# Patient Record
Sex: FEMALE | Race: WHITE | Employment: OTHER | URBAN - METROPOLITAN AREA
[De-identification: names, ages, dates, MRNs, and addresses within clinical notes are randomized per-mention and may not be internally consistent; named-entity substitution may affect disease eponyms.]

---

## 2017-02-17 DIAGNOSIS — G43.009 MIGRAINE WITHOUT AURA AND WITHOUT STATUS MIGRAINOSUS, NOT INTRACTABLE: ICD-10-CM

## 2017-02-17 RX ORDER — RIZATRIPTAN BENZOATE 10 MG/1
TABLET, ORALLY DISINTEGRATING ORAL
Qty: 6 TABLET | Refills: 3 | Status: SHIPPED | OUTPATIENT
Start: 2017-02-17 | End: 2018-06-13

## 2017-04-20 ENCOUNTER — TRANSFERRED RECORDS (OUTPATIENT)
Dept: HEALTH INFORMATION MANAGEMENT | Facility: CLINIC | Age: 57
End: 2017-04-20

## 2017-05-24 ENCOUNTER — TRANSFERRED RECORDS (OUTPATIENT)
Dept: HEALTH INFORMATION MANAGEMENT | Facility: CLINIC | Age: 57
End: 2017-05-24

## 2017-05-24 LAB
ALT SERPL-CCNC: 23 IU/L (ref 5–35)
AST SERPL-CCNC: 27 U/L (ref 5–34)
CREAT SERPL-MCNC: 0.59 MG/DL
GFR SERPL CREATININE-BSD FRML MDRD: 111.7 ML/MIN/1.73M2
TSH SERPL-ACNC: 2.52 MIU/L (ref 0.4–4.5)

## 2017-06-02 ENCOUNTER — OFFICE VISIT (OUTPATIENT)
Dept: FAMILY MEDICINE | Facility: CLINIC | Age: 57
End: 2017-06-02
Payer: COMMERCIAL

## 2017-06-02 ENCOUNTER — RADIANT APPOINTMENT (OUTPATIENT)
Dept: GENERAL RADIOLOGY | Facility: CLINIC | Age: 57
End: 2017-06-02
Attending: FAMILY MEDICINE
Payer: COMMERCIAL

## 2017-06-02 VITALS
WEIGHT: 156.7 LBS | TEMPERATURE: 97.8 F | SYSTOLIC BLOOD PRESSURE: 118 MMHG | DIASTOLIC BLOOD PRESSURE: 62 MMHG | HEIGHT: 61 IN | OXYGEN SATURATION: 95 % | HEART RATE: 94 BPM | BODY MASS INDEX: 29.59 KG/M2

## 2017-06-02 DIAGNOSIS — M25.562 LEFT KNEE PAIN, UNSPECIFIED CHRONICITY: ICD-10-CM

## 2017-06-02 DIAGNOSIS — M19.90 INFLAMMATORY ARTHRITIS: ICD-10-CM

## 2017-06-02 DIAGNOSIS — R21 RASH: ICD-10-CM

## 2017-06-02 DIAGNOSIS — K50.90 CROHN'S DISEASE WITHOUT COMPLICATION, UNSPECIFIED GASTROINTESTINAL TRACT LOCATION (H): ICD-10-CM

## 2017-06-02 DIAGNOSIS — M25.562 LEFT KNEE PAIN, UNSPECIFIED CHRONICITY: Primary | ICD-10-CM

## 2017-06-02 PROCEDURE — 73560 X-RAY EXAM OF KNEE 1 OR 2: CPT | Mod: LT

## 2017-06-02 PROCEDURE — 99213 OFFICE O/P EST LOW 20 MIN: CPT | Performed by: FAMILY MEDICINE

## 2017-06-02 ASSESSMENT — PAIN SCALES - GENERAL: PAINLEVEL: SEVERE PAIN (7)

## 2017-06-02 NOTE — PROGRESS NOTES
"  SUBJECTIVE:                                                    Ryan Cruz is a 56 year old female who presents to clinic today for the following health issues:      Joint Pain     Onset: January 2017    Description:   Location: left knee  Character: Gnawing and Fullness    Intensity: moderate    Progression of Symptoms: intermittent    Accompanying Signs & Symptoms:  Other symptoms: weakness of knee and swelling   History:   Previous similar pain: no       Precipitating factors:   Trauma or overuse: YES    Alleviating factors:  Improved by: rest/inactivity       Therapies Tried and outcome: none      Knee Pain -- Pt is unsure as to why her left knee has been hurting although she assumes she hurt it in January at yoga. She heard a \"ding\" in her knee causing her to fall over while doing yoga. She had immediate pain and swelling and was difficult to walk for a few days. Pain has been intermittent and persistent since then.    She had a cold a few months ago which required her to rest allowing the pain to temporarily alleviate. The pain has come back since she has been more active and the pain worsens the more she is on her feet. Pt says the pain  is behind the knee but occasionally feels below the knee-cap. 6 days ago it woke her in the middle of the night and her knee was swollen to the point where she could not bend it. Ice helps the swelling and pain. She cannot kneel on it  She also fell on her R knee last week trying to carry her dog but has no serious concerns.  Pt denies claustrophobia in regards to MRI    Pt also showed concern in the skin of her R hand. She says it starts as a blister, turns hard, and will fall off- which is painful. Last saw dermatology in the fall and reports derm did not know the cause of the rash, She has tried many creams which did not help.    Pt started discussing issues with calcium citrate. She took her first pill today and felt sick immediately- diarrhea. She was wondering if " she should continue this medication.      Problem list and histories reviewed & adjusted, as indicated.  Additional history: as documented    Patient Active Problem List   Diagnosis     Migraine without aura and without status migrainosus, not intractable     Allergic rhinitis     Inflammatory arthritis     Crohns disease (H)     CARDIOVASCULAR SCREENING; LDL GOAL LESS THAN 160     Health Care Home     Inflammatory bowel disease     Pulmonary nodule     Encounter for dietary counseling and surveillance     Left sided ulcerative (chronic) colitis (H)     Overweight (BMI 25.0-29.9)     Advance care planning     Past Surgical History:   Procedure Laterality Date     CARPAL TUNNEL RELEASE RT/LT       HYSTERECTOMY, SUPRACERVICAL LAPAROSCOPIC         Social History   Substance Use Topics     Smoking status: Never Smoker     Smokeless tobacco: Never Used     Alcohol use No     Family History   Problem Relation Age of Onset     Arthritis Mother      rheumatoid arthritis     CANCER Mother      stomach     GASTROINTESTINAL DISEASE Father       at 68 from Crohn's     DIABETES Father      Arthritis Sister      Rheumatoid arthritis     CEREBROVASCULAR DISEASE Maternal Grandfather      CEREBROVASCULAR DISEASE Paternal Grandfather          Current Outpatient Prescriptions   Medication Sig Dispense Refill     rizatriptan (MAXALT-MLT) 10 MG ODT tab Take 1 tablet with onset of migraine, may repeat once after 2 hrs. Do not exceed 3 tabs in 24 hrs. 6 tablet 3     Vedolizumab (ENTYVIO) 60 MG/ML injection        hyoscyamine (LEVSIN/SL) 0.125 MG SL tablet   0     polyethylene glycol (MIRALAX/GLYCOLAX) powder   4     mesalamine (CANASA) 1000 MG suppository Place 1 suppository rectally At Bedtime.       co-enzyme Q-10 (COQ10) 100 MG CAPS Take 1 capsule by mouth daily.       Cyanocobalamin (SM VITAMIN B12 TR) 2000 MCG TBCR Take 1 tablet by mouth daily.       UNABLE TO FIND MEDICATION NAME: Estrofactor - take 2 tablets by  mouth twice daily       PROBIOTIC PRODUCT PO Take 1 capsule by mouth daily.       UNABLE TO FIND MEDICATION NAME: Zinc liquid - 1 tsp daily       Calcium Carbonate-Vitamin D (CALCIUM 600 + D OR) Take 1 tablet by mouth daily.       Methotrexate Sodium (METHOTREXATE PO) Take 17.5 mg by mouth once a week. Takes on Sundays  2.5 mg x 7 tabs= 17.5 mg       Cholecalciferol (VITAMIN D3 PO) Take 5,000 Units by mouth daily.       hydroxychloroquine (PLAQUENIL) 200 MG tablet Take 200 mg by mouth 2 times daily.       FOLIC ACID PO Take 2 mg by mouth daily. Takes 2 x 1 mg tabs       LYSINE Take  by mouth every morning. Unknown strength       pseudoePHEDrine (SUDAFED) 120 MG 12 hr tablet Take 120 mg by mouth every 12 hours.       leucovorin (WELLCOVORIN) 5 MG tablet Take 5 mg by mouth daily.       GLUTAMINE PO Take 2,250 mg by mouth daily.       PEPPERMINT OIL Take 1 capsule by mouth daily.       cetirizine (ZYRTEC) 10 MG tablet Take 10 mg by mouth daily. One a day       balsalazide (COLAZAL) 750 MG capsule Take 2,250 mg by mouth 3 times daily.       Allergies   Allergen Reactions     Penicillins Anaphylaxis     Remicade [Infliximab Injection] Nausea and Vomiting, Hives, Itching and Swelling     Arthritis     Shellfish Allergy Anaphylaxis     6 Mercaptopurine [Mercaptopurine]      Headaches and mouth/throat sores     Bactrim Nausea and Vomiting     Omnicef Nausea and Vomiting     Oxycodone Nausea     Prednisone      Racing heart       Reviewed and updated as needed this visit by clinical staff  Tobacco  Allergies  Meds  Med Hx  Surg Hx  Fam Hx  Soc Hx      Reviewed and updated as needed this visit by Provider         ROS:  Constitutional, HEENT, cardiovascular, pulmonary, gi and gu systems are negative, except as otherwise noted.  This document serves as a record of the services and decisions personally performed and made by Jaelyn Garcia MD. It was created on her behalf by Nakita Corona, a trained medical  "scribe. The creation of this document is based the provider's statements to the medical scribe.  Nakita Corona June 2, 2017 2:47 PM      OBJECTIVE:                                                    /62 (BP Location: Right arm, Patient Position: Chair, Cuff Size: Adult Regular)  Pulse 94  Temp 97.8  F (36.6  C) (Oral)  Ht 1.56 m (5' 1.42\")  Wt 71.1 kg (156 lb 11.2 oz)  LMP 04/17/2014  SpO2 95%  Breastfeeding? No  BMI 29.21 kg/m2  Body mass index is 29.21 kg/(m^2).  GENERAL: healthy, alert and no distress, overweight  MS: left knee with small joint effesion.  patellar crepitus and apprehension present and tenderness, infrapatellar tenderness, Loli positive with lateral stressing. No significant joint line tenderness. Posterior knee tender and full (chronic per patient since this started)  SKIN: hands with red thickened and slightly lichenified skin in interdigital space 1st-2nd fingers and dorsum of mcps  NEURO: Normal strength and tone, mentation intact and speech normal  PSYCH: mentation appears normal, affect normal/bright    Diagnostic Test Results:  Xray -no fracture, ? Mild medial left knee joint space narrowing and nl alignment      ASSESSMENT/PLAN:                                                      1. Left knee pain, unspecified chronicity  Hx of inflammatory arhtritis.   Suspect baker's cyst (given chronicity not concerned about dvt). Need to r/o also meniscus tear. Xray unrevealing.  MRI to be scheduled. Continue to ice, wear compression brace, rest prn. Likely ortho referral needed for injection (pt quite sensitive to oral prednisone). Doubt her chronic inflammatory arthritis given onset with exercise, etc.   - MR Knee Left w/o Contrast; Future  - XR Knee Bilateral 1/2 Views; Future    2. Crohn's- ? Flare with calcium citrate-. Discussed unlikely med to cause this. rec taking pill other time of day or consider different brand  3. Rash- f/u with derm advised    Patient Instructions "   Continue exercise and daily activity as long as their is no pain      The information in this document, created by the medical scribe for me, accurately reflects the services I personally performed and the decisions made by me. I have reviewed and approved this document for accuracy.   MD Jaelyn Olvera MD  [facility]

## 2017-06-02 NOTE — NURSING NOTE
"Chief Complaint   Patient presents with     Knee Injury     left knee and right knee starting to hurt       Initial /62 (BP Location: Right arm, Patient Position: Chair, Cuff Size: Adult Regular)  Pulse 94  Temp 97.8  F (36.6  C) (Oral)  Ht 1.56 m (5' 1.42\")  Wt 71.1 kg (156 lb 11.2 oz)  LMP 04/17/2014  SpO2 95%  Breastfeeding? No  BMI 29.21 kg/m2 Estimated body mass index is 29.21 kg/(m^2) as calculated from the following:    Height as of this encounter: 1.56 m (5' 1.42\").    Weight as of this encounter: 71.1 kg (156 lb 11.2 oz).  Medication Reconciliation: complete     ED Luu MA      "

## 2017-06-02 NOTE — PATIENT INSTRUCTIONS
Continue exercise and daily activity as long as their is no pain.  Wear your knee brace.    Please call [facility] (formerly [facility]) at [phone] to schedule mri knee     Follow up with dermatology concerning your hand.

## 2017-06-02 NOTE — MR AVS SNAPSHOT
After Visit Summary   6/2/2017    Ryan Cruz    MRN: [id number]           Patient Information     Date Of Birth          1960        Visit Information        Provider Department      6/2/2017 2:20 PM Jaelyn Garcia MD [facility]        Today's Diagnoses     Left knee pain, unspecified chronicity    -  1    Crohn's disease without complication, unspecified gastrointestinal tract location (H)        Rash          Care Instructions    Continue exercise and daily activity as long as their is no pain.  Wear your knee brace.    Please call [facility] (formerly called [facility]) at [phone] to schedule mri knee     Follow up with dermatology concerning your hand.          Follow-ups after your visit        Future tests that were ordered for you today     Open Future Orders        Priority Expected Expires Ordered    MR Knee Left w/o Contrast Routine  6/2/2018 6/2/2017            Who to contact     If you have questions or need follow up information about today's clinic visit or your schedule please contact [facility] directly at [phone].  Normal or non-critical lab and imaging results will be communicated to you by echoBasehart, letter or phone within 4 business days after the clinic has received the results. If you do not hear from us within 7 days, please contact the clinic through echoBasehart or phone. If you have a critical or abnormal lab result, we will notify you by phone as soon as possible.  Submit refill requests through Conelum or call your pharmacy and they will forward the refill request to us. Please allow 3 business days for your refill to be completed.          Additional Information About Your Visit        MyChart Information     Conelum gives you secure access to your electronic health record. If you see a primary care provider, you can also send messages to your care team and make  "appointments. If you have questions, please call your primary care clinic.  If you do not have a primary care provider, please call [phone] and they will assist you.        Care EveryWhere ID     This is your Care EveryWhere ID. This could be used by other organizations to access your [facility] medical records  [id number]        Your Vitals Were     Pulse Temperature Height Last Period Pulse Oximetry Breastfeeding?    94 97.8  F (36.6  C) (Oral) 1.56 m (5' 1.42\") 04/17/2014 95% No    BMI (Body Mass Index)                   29.21 kg/m2            Blood Pressure from Last 3 Encounters:   06/02/17 118/62   09/09/16 102/68   09/14/15 116/70    Weight from Last 3 Encounters:   06/02/17 71.1 kg (156 lb 11.2 oz)   09/09/16 70 kg (154 lb 4.8 oz)   09/14/15 66.5 kg (146 lb 8 oz)               Primary Care Provider Office Phone # Fax #    Jaelyn Kelli Garcia [phone]       [facility] [address] [place] [address] [address]  [facility] [address]        Thank you!     Thank you for choosing [facility]  for your care. Our goal is always to provide you with excellent care. Hearing back from our patients is one way we can continue to improve our services. Please take a few minutes to complete the written survey that you may receive in the mail after your visit with us. Thank you!             Your Updated Medication List - Protect others around you: Learn how to safely use, store and throw away your medicines at www.disposemymeds.org.          This list is accurate as of: 6/2/17  3:34 PM.  Always use your most recent med list.                   Brand Name Dispense Instructions for use    balsalazide 750 MG capsule    COLAZAL     Take 2,250 mg by mouth 3 times daily.       CALCIUM 600 + D PO      Take 1 tablet by mouth daily.       CANASA 1000 MG Suppository   Generic drug:  mesalamine      Place 1 suppository rectally At Bedtime.       co-enzyme Q-10 100 MG Caps capsule      Take 1 capsule by " mouth daily.       ENTYVIO 60 MG/ML injection   Generic drug:  vedolizumab          FOLIC ACID PO      Take 2 mg by mouth daily. Takes 2 x 1 mg tabs       GLUTAMINE PO      Take 2,250 mg by mouth daily.       hydroxychloroquine 200 MG tablet    PLAQUENIL     Take 200 mg by mouth 2 times daily.       hyoscyamine 0.125 MG sublingual tablet    LEVSIN/SL         leucovorin 5 MG tablet    WELLCOVORIN     Take 5 mg by mouth daily.       LYSINE      Take  by mouth every morning. Unknown strength       METHOTREXATE PO      Take 17.5 mg by mouth once a week. Takes on Sundays 2.5 mg x 7 tabs= 17.5 mg       PEPPERMINT OIL      Take 1 capsule by mouth daily.       polyethylene glycol powder    MIRALAX/GLYCOLAX         PROBIOTIC PRODUCT PO      Take 1 capsule by mouth daily.       pseudoePHEDrine 120 MG 12 hr tablet    SUDAFED     Take 120 mg by mouth every 12 hours.       rizatriptan 10 MG ODT tab    MAXALT-MLT    6 tablet    Take 1 tablet with onset of migraine, may repeat once after 2 hrs. Do not exceed 3 tabs in 24 hrs.       SM VITAMIN B12 TR 2000 MCG Tbcr   Generic drug:  Cyanocobalamin      Take 1 tablet by mouth daily.       * UNABLE TO FIND      MEDICATION NAME: Estrofactor - take 2 tablets by mouth twice daily       * UNABLE TO FIND      MEDICATION NAME: Zinc liquid - 1 tsp daily       VITAMIN D3 PO      Take 5,000 Units by mouth daily.       ZYRTEC 10 MG tablet   Generic drug:  cetirizine      Take 10 mg by mouth daily. One a day       * Notice:  This list has 2 medication(s) that are the same as other medications prescribed for you. Read the directions carefully, and ask your doctor or other care provider to review them with you.

## 2017-06-08 ENCOUNTER — RADIANT APPOINTMENT (OUTPATIENT)
Dept: MRI IMAGING | Facility: CLINIC | Age: 57
End: 2017-06-08
Attending: FAMILY MEDICINE
Payer: COMMERCIAL

## 2017-06-08 DIAGNOSIS — S83.209A CURRENT TEAR OF MENISCUS OF KNEE, UNSPECIFIED LATERALITY, UNSPECIFIED MENISCUS, UNSPECIFIED TEAR TYPE, INITIAL ENCOUNTER: Primary | ICD-10-CM

## 2017-06-08 DIAGNOSIS — M25.562 LEFT KNEE PAIN, UNSPECIFIED CHRONICITY: ICD-10-CM

## 2017-06-08 PROCEDURE — 73721 MRI JNT OF LWR EXTRE W/O DYE: CPT | Mod: LT | Performed by: RADIOLOGY

## 2017-06-19 ENCOUNTER — PRE VISIT (OUTPATIENT)
Dept: ORTHOPEDICS | Facility: CLINIC | Age: 57
End: 2017-06-19

## 2017-06-19 NOTE — TELEPHONE ENCOUNTER
Pt reports being seen for : knee pain with meniscus tear since January 1. Do you have recent xrays (if not seen in EPIC)? Yes - Xray are in EPIC.  2. Do you have any MRI's (if not seen in EPIC)?Yes - Confirmed in EPIC.  3. Have you had surgery in the past for the same issue?No.   4. Are you being referred by another provider? Yes: Dr. Garcia  If yes-Records in Epic.  5. Is this work comp or MVA related? No.   Diana Castanon RN

## 2017-06-20 ENCOUNTER — TRANSFERRED RECORDS (OUTPATIENT)
Dept: HEALTH INFORMATION MANAGEMENT | Facility: CLINIC | Age: 57
End: 2017-06-20

## 2017-06-21 ENCOUNTER — TRANSFERRED RECORDS (OUTPATIENT)
Dept: HEALTH INFORMATION MANAGEMENT | Facility: CLINIC | Age: 57
End: 2017-06-21

## 2017-06-22 ENCOUNTER — OFFICE VISIT (OUTPATIENT)
Dept: ORTHOPEDICS | Facility: CLINIC | Age: 57
End: 2017-06-22
Payer: COMMERCIAL

## 2017-06-22 VITALS
HEART RATE: 81 BPM | SYSTOLIC BLOOD PRESSURE: 111 MMHG | WEIGHT: 156.7 LBS | HEIGHT: 61 IN | OXYGEN SATURATION: 98 % | BODY MASS INDEX: 29.59 KG/M2 | DIASTOLIC BLOOD PRESSURE: 67 MMHG

## 2017-06-22 DIAGNOSIS — M17.10 PRIMARY OSTEOARTHRITIS OF KNEE, UNSPECIFIED LATERALITY: Primary | ICD-10-CM

## 2017-06-22 PROCEDURE — 99203 OFFICE O/P NEW LOW 30 MIN: CPT | Mod: GC | Performed by: ORTHOPAEDIC SURGERY

## 2017-06-22 NOTE — PROGRESS NOTES
Patient seen and examined with the resident.     Assesment: Medial Meniscus root tear, early chondrosis, minimally symptomatic, not interested in intervention    Plan: reviewed options, patient understands that she is at risk of progressive OA of medial compartment, which may / or may not be alleviated by surgery.    Patient is not interested in surgical intervention at this time. Will continue to observe, could have injection if she wants. Her knee may continue to wear out, though if this does, it could be treated with arthroplasty in the future    I agree with history, physical and imaging as well as the assessment and plan as detailed by Dr. Ellison.

## 2017-06-22 NOTE — PATIENT INSTRUCTIONS
Thanks for coming today.  Ortho/Sports Medicine Clinic  [address] [place] [address]    To schedule future appointments in Ortho Clinic, you may call [phone].    To schedule ordered imaging by your Provider: Call [facility] at [phone]    [facility] available online at:   [email or web address]    Please call if any further questions or concerns [phone] and ask for the Orthopedic Department. Clinic hours 8 am to 5 pm.    Return to clinic if symptoms worsen.

## 2017-06-22 NOTE — MR AVS SNAPSHOT
After Visit Summary   6/22/2017    Ryan Cruz    MRN: [id number]           Patient Information     Date Of Birth          1960        Visit Information        Provider Department      6/22/2017 10:30 AM Dean Strickland MD [facility]        Today's Diagnoses     Primary osteoarthritis of knee, unspecified laterality    -  1      Care Instructions    Thanks for coming today.  Ortho/Sports Medicine Clinic  [address] [place] [address]    To schedule future appointments in Ortho Clinic, you may call [phone].    To schedule ordered imaging by your Provider: Call [facility] at [phone]    [facility] available online at:   [email or web address]    Please call if any further questions or concerns [phone] and ask for the Orthopedic Department. Clinic hours 8 am to 5 pm.    Return to clinic if symptoms worsen.            Follow-ups after your visit        Who to contact     If you have questions or need follow up information about today's clinic visit or your schedule please contact [facility] directly at [phone].  Normal or non-critical lab and imaging results will be communicated to you by EMBIhart, letter or phone within 4 business days after the clinic has received the results. If you do not hear from us within 7 days, please contact the clinic through Comenta TVt or phone. If you have a critical or abnormal lab result, we will notify you by phone as soon as possible.  Submit refill requests through Adpeps or call your pharmacy and they will forward the refill request to us. Please allow 3 business days for your refill to be completed.          Additional Information About Your Visit        EMBIhart Information     Adpeps gives you secure access to your electronic health record. If you see a primary care provider, you can also send messages to your care team and make appointments. If you have questions, please  "call your primary care clinic.  If you do not have a primary care provider, please call [phone] and they will assist you.      ValueClick is an electronic gateway that provides easy, online access to your medical records. With ValueClick, you can request a clinic appointment, read your test results, renew a prescription or communicate with your care team.     To access your existing account, please contact your [facility] or call [phone] for assistance.        Care EveryWhere ID     This is your Care EveryWhere ID. This could be used by other organizations to access your [facility] medical records  [id number]        Your Vitals Were     Pulse Height Last Period Pulse Oximetry BMI (Body Mass Index)       81 1.56 m (5' 1.42\") 04/17/2014 98% 29.2 kg/m2        Blood Pressure from Last 3 Encounters:   06/22/17 111/67   06/02/17 118/62   09/09/16 102/68    Weight from Last 3 Encounters:   06/22/17 71.1 kg (156 lb 11.2 oz)   06/02/17 71.1 kg (156 lb 11.2 oz)   09/09/16 70 kg (154 lb 4.8 oz)              Today, you had the following     No orders found for display       Primary Care Provider Office Phone # Fax #    Jaelyn Garcia [phone]       [facility] [address] [facility] [address]  [facility] [address]        Equal Access to Services     [facility]: Hadii aad ku hadasho Soomaali, waaxda luqadaha, qaybta kaalmada adeegyada, hoa barclay haynicon carly buckner . So [facility] [phone].    ATENCIÓN: Si habla español, tiene a sommers disposición servicios gratuitos de asistencia lingüística. Llame al [phone].    We comply with applicable federal civil rights laws and [place] laws. We do not discriminate on the basis of race, color, national origin, age, disability sex, sexual orientation or gender identity.            Thank you!     Thank you for choosing [facility]  for your care. Our goal is always to provide you with excellent " care. Hearing back from our patients is one way we can continue to improve our services. Please take a few minutes to complete the written survey that you may receive in the mail after your visit with us. Thank you!             Your Updated Medication List - Protect others around you: Learn how to safely use, store and throw away your medicines at www.disposemymeds.org.          This list is accurate as of: 6/22/17 11:59 PM.  Always use your most recent med list.                   Brand Name Dispense Instructions for use Diagnosis    balsalazide 750 MG capsule    COLAZAL     Take 2,250 mg by mouth 3 times daily.        CALCIUM 600 + D PO      Take 1 tablet by mouth daily.        CANASA 1000 MG Suppository   Generic drug:  mesalamine      Place 1 suppository rectally At Bedtime.        co-enzyme Q-10 100 MG Caps capsule      Take 1 capsule by mouth daily.        ENTYVIO 60 MG/ML injection   Generic drug:  vedolizumab           FOLIC ACID PO      Take 2 mg by mouth daily. Takes 2 x 1 mg tabs        GLUTAMINE PO      Take 2,250 mg by mouth daily.        hydroxychloroquine 200 MG tablet    PLAQUENIL     Take 200 mg by mouth 2 times daily.        hyoscyamine 0.125 MG sublingual tablet    LEVSIN/SL          leucovorin 5 MG tablet    WELLCOVORIN     Take 5 mg by mouth daily.        LYSINE      Take  by mouth every morning. Unknown strength        METHOTREXATE PO      Take 17.5 mg by mouth once a week. Takes on Sundays 2.5 mg x 7 tabs= 17.5 mg        PEPPERMINT OIL      Take 1 capsule by mouth daily.        polyethylene glycol powder    MIRALAX/GLYCOLAX          PROBIOTIC PRODUCT PO      Take 1 capsule by mouth daily.        pseudoePHEDrine 120 MG 12 hr tablet    SUDAFED     Take 120 mg by mouth every 12 hours.        rizatriptan 10 MG ODT tab    MAXALT-MLT    6 tablet    Take 1 tablet with onset of migraine, may repeat once after 2 hrs. Do not exceed 3 tabs in 24 hrs.    Migraine without aura and without status  migrainosus, not intractable       SM VITAMIN B12 TR 2000 MCG Tbcr   Generic drug:  Cyanocobalamin      Take 1 tablet by mouth daily.        * UNABLE TO FIND      MEDICATION NAME: Estrofactor - take 2 tablets by mouth twice daily        * UNABLE TO FIND      MEDICATION NAME: Zinc liquid - 1 tsp daily        VITAMIN D3 PO      Take 5,000 Units by mouth daily.        ZYRTEC 10 MG tablet   Generic drug:  cetirizine      Take 10 mg by mouth daily. One a day        * Notice:  This list has 2 medication(s) that are the same as other medications prescribed for you. Read the directions carefully, and ask your doctor or other care provider to review them with you.

## 2017-06-22 NOTE — NURSING NOTE
"Ryan Cruz's goals for this visit include: Left knee tear consult-only pain is from cyst in back of knee  She requests these members of her care team be copied on today's visit information: yes    PCP: Jaelyn Garcia    Referring Provider:  No referring provider defined for this encounter.    Chief Complaint   Patient presents with     Consult     Knee Pain     Left knee meniscus tear       Initial /67  Pulse 81  Ht 1.56 m (5' 1.42\")  Wt 71.1 kg (156 lb 11.2 oz)  LMP 04/17/2014  SpO2 98%  BMI 29.2 kg/m2 Estimated body mass index is 29.2 kg/(m^2) as calculated from the following:    Height as of this encounter: 1.56 m (5' 1.42\").    Weight as of this encounter: 71.1 kg (156 lb 11.2 oz).  Medication Reconciliation: complete    "

## 2017-06-22 NOTE — PROGRESS NOTES
CHIEF COMPLAINT:  Left knee pain.      HISTORY OF PRESENT ILLNESS:  Ms. Cruz is a 56-year-old female with several months of left knee pain that began in approximately 01/2017.  There was no specific inciting event.  She describes the pain as predominantly being in the back of her knee.  Wearing a sleeve, resting and using ice does improve her pain and she believes her pain has improved quite significantly over the last 1-2 months.  She was referred here by Dr. Carline Garcia of [place], [place].  Of note, the patient does have Crohn's disease and is on methotrexate.  The patient does not have any other musculoskeletal complaints.  The patient does not predominantly have pain on the inside versus outside of her knee.      REVIEW OF SYSTEMS:  A 12-point review of systems is otherwise negative.      PAST SURGICAL HISTORY:  Significant for Crohn's.      MEDICATIONS:  Include methotrexate.  Others are per Epic.  No changes.      ALLERGIES:  INCLUDE PENICILLIN.  SHE HAS AN ANAPHYLACTIC REACTION TO THIS.  SHE HAS NAUSEA WITH REMICADE.  SHE ALSO HAS A SHELLFISH ALLERGY.  SHE IS ALSO ALLERGIC TO BACTRIM, OXYCODONE AND PREDNISONE.      FAMILY HISTORY:  No bleeding, clotting disorders or reactions to anesthesia      SOCIAL HISTORY:  The patient is retired.  She does not have any bad habits.  She enjoys traveling.      PAST SURGICAL HISTORY:  Includes a hysterectomy.      PHYSICAL EXAMINATION:  The patient is in no acute distress, breathing comfortably.  Specific examination of the left knee demonstrates no significant effusion.  No tenderness to palpation over the medial lateral joint line.  Negative Sharee's.  The patient has full extension to 130 degrees of flexion, stable to varus and valgus stress, anterior and posterior drawer.      IMAGING:  X-rays of the bilateral knees are reviewed.  This demonstrates mild medial joint space narrowing of the left knee.  MRI of the left knee demonstrates a root tear of the  medial meniscus.  There is no significant osteoarthritis currently present in the knee.      ASSESSMENT:   1.  Medial meniscus root tear.   2.  Minimal osteoarthritis.      PLAN:  We discussed with Ms. Cruz possible options moving forward including continued observation versus injection versus repair arthroscopically.  The patient elects to consider continued conservative measures as she does not have significant pain currently.  We did discuss with her that she is at a high risk of progressing to further arthritis with or without an operation secondary to her root tear.  The patient is aware of her risk of progression to OA and potential need for a knee replacement in the future.  We are happy to see her back at any time if her symptoms become more bothersome.        Dr. Strickland saw and evaluated the patient and is in agreement with the above stated plan.      Dictated by Morena Ellison MD, Fellow

## 2017-07-06 ENCOUNTER — HOME INFUSION (PRE-WILLOW HOME INFUSION) (OUTPATIENT)
Dept: PHARMACY | Facility: CLINIC | Age: 57
End: 2017-07-06

## 2017-08-28 NOTE — PROGRESS NOTES
This is a recent snapshot of the patient's [facility] medical record.  For current drug dose and complete information and questions, call [phone] or In Basket pool, [facility] home infusion ([id number])  CSN Number:  [id number]

## 2017-09-22 ENCOUNTER — ALLIED HEALTH/NURSE VISIT (OUTPATIENT)
Dept: NURSING | Facility: CLINIC | Age: 57
End: 2017-09-22
Payer: COMMERCIAL

## 2017-09-22 ENCOUNTER — TRANSFERRED RECORDS (OUTPATIENT)
Dept: HEALTH INFORMATION MANAGEMENT | Facility: CLINIC | Age: 57
End: 2017-09-22

## 2017-09-22 DIAGNOSIS — Z23 NEED FOR PROPHYLACTIC VACCINATION AND INOCULATION AGAINST INFLUENZA: Primary | ICD-10-CM

## 2017-09-22 PROCEDURE — 90686 IIV4 VACC NO PRSV 0.5 ML IM: CPT

## 2017-09-22 PROCEDURE — 90471 IMMUNIZATION ADMIN: CPT

## 2017-09-22 PROCEDURE — 99207 ZZC NO CHARGE NURSE ONLY: CPT

## 2017-09-22 NOTE — PROGRESS NOTES
Injectable Influenza Immunization Documentation    1.  Is the person to be vaccinated sick today?   No    2. Does the person to be vaccinated have an allergy to a component   of the vaccine?   No    3. Has the person to be vaccinated ever had a serious reaction   to influenza vaccine in the past?  Local reaction many years ago    4. Has the person to be vaccinated ever had Guillain-Barré syndrome?   No    Form completed by Courtney Snachez MA

## 2017-09-22 NOTE — MR AVS SNAPSHOT
After Visit Summary   9/22/2017    yRan Cruz    MRN: [id number]           Patient Information     Date Of Birth          1960        Visit Information        Provider Department      9/22/2017 11:00 AM [facility]LL[facility]        Today's Diagnoses     Need for prophylactic vaccination and inoculation against influenza    -  1       Follow-ups after your visit        Who to contact     If you have questions or need follow up information about today's clinic visit or your schedule please contact [facility] directly at [phone].  Normal or non-critical lab and imaging results will be communicated to you by Karazhart, letter or phone within 4 business days after the clinic has received the results. If you do not hear from us within 7 days, please contact the clinic through Karazhart or phone. If you have a critical or abnormal lab result, we will notify you by phone as soon as possible.  Submit refill requests through Coupang or call your pharmacy and they will forward the refill request to us. Please allow 3 business days for your refill to be completed.          Additional Information About Your Visit        MyChart Information     Coupang gives you secure access to your electronic health record. If you see a primary care provider, you can also send messages to your care team and make appointments. If you have questions, please call your primary care clinic.  If you do not have a primary care provider, please call [phone] and they will assist you.        Care EveryWhere ID     This is your Care EveryWhere ID. This could be used by other organizations to access your [facility] medical records  [id number]        Your Vitals Were     Last Period                   04/17/2014            Blood Pressure from Last 3 Encounters:   06/22/17 111/67   06/02/17 118/62   09/09/16 102/68    Weight from Last 3 Encounters:   06/22/17 71.1 kg (156 lb 11.2 oz)   06/02/17  71.1 kg (156 lb 11.2 oz)   09/09/16 70 kg (154 lb 4.8 oz)              We Performed the Following     FLU VAC, SPLIT VIRUS IM > 3 YO (QUADRIVALENT) [70717]     Vaccine Administration, Initial [30253]        Primary Care Provider Office Phone # Fax #    Jaelyn Garcia [phone] [address]  [place] [address]        Equal Access to Services     [facility] : Hadii aad ku hadasho Soomaali, waaxda luqadaha, qaybta kaalmada adeegyada, waxay idiin hayaan adeeg kharash la'aan . So [facility] [phone].    ATENCIÓN: Si habla español, tiene a sommers disposición servicios gratuitos de asistencia lingüística. [facility] [phone].    We comply with applicable federal civil rights laws and [place] laws. We do not discriminate on the basis of race, color, national origin, age, disability sex, sexual orientation or gender identity.            Thank you!     Thank you for choosing [facility]  for your care. Our goal is always to provide you with excellent care. Hearing back from our patients is one way we can continue to improve our services. Please take a few minutes to complete the written survey that you may receive in the mail after your visit with us. Thank you!             Your Updated Medication List - Protect others around you: Learn how to safely use, store and throw away your medicines at www.disposemymeds.org.          This list is accurate as of: 9/22/17 11:00 AM.  Always use your most recent med list.                   Brand Name Dispense Instructions for use Diagnosis    balsalazide 750 MG capsule    COLAZAL     Take 2,250 mg by mouth 3 times daily.        CALCIUM 600 + D PO      Take 1 tablet by mouth daily.        CANASA 1000 MG Suppository   Generic drug:  mesalamine      Place 1 suppository rectally At Bedtime.        co-enzyme Q-10 100 MG Caps capsule      Take 1 capsule by mouth daily.        ENTYVIO 60 MG/ML injection   Generic drug:  vedolizumab            FOLIC ACID PO      Take 2 mg by mouth daily. Takes 2 x 1 mg tabs        GLUTAMINE PO      Take 2,250 mg by mouth daily.        hydroxychloroquine 200 MG tablet    PLAQUENIL     Take 200 mg by mouth 2 times daily.        hyoscyamine 0.125 MG sublingual tablet    LEVSIN/SL          leucovorin 5 MG tablet    WELLCOVORIN     Take 5 mg by mouth daily.        LYSINE      Take  by mouth every morning. Unknown strength        METHOTREXATE PO      Take 17.5 mg by mouth once a week. Takes on Sundays 2.5 mg x 7 tabs= 17.5 mg        PEPPERMINT OIL      Take 1 capsule by mouth daily.        polyethylene glycol powder    MIRALAX/GLYCOLAX          PROBIOTIC PRODUCT PO      Take 1 capsule by mouth daily.        pseudoePHEDrine 120 MG 12 hr tablet    SUDAFED     Take 120 mg by mouth every 12 hours.        rizatriptan 10 MG ODT tab    MAXALT-MLT    6 tablet    Take 1 tablet with onset of migraine, may repeat once after 2 hrs. Do not exceed 3 tabs in 24 hrs.    Migraine without aura and without status migrainosus, not intractable       SM VITAMIN B12 TR 2000 MCG Tbcr   Generic drug:  Cyanocobalamin      Take 1 tablet by mouth daily.        * UNABLE TO FIND      MEDICATION NAME: Estrofactor - take 2 tablets by mouth twice daily        * UNABLE TO FIND      MEDICATION NAME: Zinc liquid - 1 tsp daily        VITAMIN D3 PO      Take 5,000 Units by mouth daily.        ZYRTEC 10 MG tablet   Generic drug:  cetirizine      Take 10 mg by mouth daily. One a day        * Notice:  This list has 2 medication(s) that are the same as other medications prescribed for you. Read the directions carefully, and ask your doctor or other care provider to review them with you.

## 2017-09-27 ENCOUNTER — TRANSFERRED RECORDS (OUTPATIENT)
Dept: HEALTH INFORMATION MANAGEMENT | Facility: CLINIC | Age: 57
End: 2017-09-27

## 2017-09-27 LAB
ALT SERPL-CCNC: 30 IU/L (ref 5–35)
AST SERPL-CCNC: 26 U/L (ref 5–34)
CREAT SERPL-MCNC: 0.5 MG/DL (ref 0.5–1.3)
GFR SERPL CREATININE-BSD FRML MDRD: 134.7 ML/MIN/1.73M2

## 2017-10-04 ENCOUNTER — RADIANT APPOINTMENT (OUTPATIENT)
Dept: MAMMOGRAPHY | Facility: CLINIC | Age: 57
End: 2017-10-04
Attending: FAMILY MEDICINE
Payer: COMMERCIAL

## 2017-10-04 DIAGNOSIS — Z12.31 VISIT FOR SCREENING MAMMOGRAM: ICD-10-CM

## 2017-10-04 PROCEDURE — G0202 SCR MAMMO BI INCL CAD: HCPCS | Performed by: STUDENT IN AN ORGANIZED HEALTH CARE EDUCATION/TRAINING PROGRAM

## 2017-10-04 PROCEDURE — 77063 BREAST TOMOSYNTHESIS BI: CPT | Performed by: STUDENT IN AN ORGANIZED HEALTH CARE EDUCATION/TRAINING PROGRAM

## 2017-10-14 ENCOUNTER — MYC MEDICAL ADVICE (OUTPATIENT)
Dept: FAMILY MEDICINE | Facility: CLINIC | Age: 57
End: 2017-10-14

## 2017-10-16 ENCOUNTER — MYC MEDICAL ADVICE (OUTPATIENT)
Dept: FAMILY MEDICINE | Facility: CLINIC | Age: 57
End: 2017-10-16

## 2017-10-24 ENCOUNTER — TRANSFERRED RECORDS (OUTPATIENT)
Dept: HEALTH INFORMATION MANAGEMENT | Facility: CLINIC | Age: 57
End: 2017-10-24

## 2017-11-03 ENCOUNTER — TRANSFERRED RECORDS (OUTPATIENT)
Dept: HEALTH INFORMATION MANAGEMENT | Facility: CLINIC | Age: 57
End: 2017-11-03

## 2017-11-13 ENCOUNTER — MYC MEDICAL ADVICE (OUTPATIENT)
Dept: FAMILY MEDICINE | Facility: CLINIC | Age: 57
End: 2017-11-13

## 2017-11-15 ENCOUNTER — TRANSFERRED RECORDS (OUTPATIENT)
Dept: HEALTH INFORMATION MANAGEMENT | Facility: CLINIC | Age: 57
End: 2017-11-15

## 2017-12-12 ENCOUNTER — HOME INFUSION (PRE-WILLOW HOME INFUSION) (OUTPATIENT)
Dept: PHARMACY | Facility: CLINIC | Age: 57
End: 2017-12-12

## 2017-12-13 NOTE — PROGRESS NOTES
This is a recent snapshot of the patient's [facility] medical record.  For current drug dose and complete information and questions, call [phone] or In Basket pool, [facility] home infusion ([id number])  CSN Number:  [id number]

## 2017-12-19 ENCOUNTER — HOME INFUSION (PRE-WILLOW HOME INFUSION) (OUTPATIENT)
Dept: PHARMACY | Facility: CLINIC | Age: 57
End: 2017-12-19

## 2018-01-02 NOTE — PROGRESS NOTES
This is a recent snapshot of the patient's [facility] medical record.  For current drug dose and complete information and questions, call [phone] or In Basket pool, fv home infusion ([id number])  CSN Number:  [id number]

## 2018-03-15 ENCOUNTER — TELEPHONE (OUTPATIENT)
Dept: FAMILY MEDICINE | Facility: CLINIC | Age: 58
End: 2018-03-15

## 2018-03-15 DIAGNOSIS — Z00.00 ENCOUNTER FOR ROUTINE ADULT HEALTH EXAMINATION WITHOUT ABNORMAL FINDINGS: Primary | ICD-10-CM

## 2018-03-15 DIAGNOSIS — K51.50 LEFT SIDED ULCERATIVE (CHRONIC) COLITIS (H): ICD-10-CM

## 2018-03-15 NOTE — TELEPHONE ENCOUNTER
Reason for Call:  Other appointment    Detailed comments: Pt would like to know what a one week fasting lab is.  Pt was explained what a fasting lab apt  is but would still  a call back for more clarification.    Phone Number Patient can be reached at: Cell number on file:    Telephone Information:   Mobile [phone]       Best Time: anytime    Can we leave a detailed message on this number? YES    Call taken on 3/15/2018 at 2:49 PM by Elijah Biggs

## 2018-03-16 NOTE — TELEPHONE ENCOUNTER
Spoke with pt, she was sent my chart message from [facility] stating pre lab before physical appt     Pt says she will schedule lab visit a few days prior to physical, please place orders.      Sue Mayes MA

## 2018-03-16 NOTE — TELEPHONE ENCOUNTER
Team please call patient and gather more information on what patient is wanting to know specifically? Patient does not have any future labs ordered. See patient call below dated 3/15/18.  Jenn Morrow RN

## 2018-05-15 ENCOUNTER — TRANSFERRED RECORDS (OUTPATIENT)
Dept: HEALTH INFORMATION MANAGEMENT | Facility: CLINIC | Age: 58
End: 2018-05-15

## 2018-05-18 DIAGNOSIS — Z00.00 ENCOUNTER FOR ROUTINE ADULT HEALTH EXAMINATION WITHOUT ABNORMAL FINDINGS: ICD-10-CM

## 2018-05-18 DIAGNOSIS — K51.50 LEFT SIDED ULCERATIVE (CHRONIC) COLITIS (H): ICD-10-CM

## 2018-05-18 LAB
CHOLEST SERPL-MCNC: 179 MG/DL
DEPRECATED CALCIDIOL+CALCIFEROL SERPL-MC: 107 UG/L (ref 20–75)
GLUCOSE SERPL-MCNC: 80 MG/DL (ref 70–99)
HDLC SERPL-MCNC: 86 MG/DL
LDLC SERPL CALC-MCNC: 84 MG/DL
NONHDLC SERPL-MCNC: 93 MG/DL
T4 FREE SERPL-MCNC: 1.15 NG/DL (ref 0.76–1.46)
TRIGL SERPL-MCNC: 43 MG/DL
TSH SERPL DL<=0.005 MIU/L-ACNC: 4.62 MU/L (ref 0.4–4)
VIT B12 SERPL-MCNC: 4034 PG/ML (ref 193–986)

## 2018-05-18 PROCEDURE — 80061 LIPID PANEL: CPT | Performed by: FAMILY MEDICINE

## 2018-05-18 PROCEDURE — 36415 COLL VENOUS BLD VENIPUNCTURE: CPT | Performed by: FAMILY MEDICINE

## 2018-05-18 PROCEDURE — 82607 VITAMIN B-12: CPT | Performed by: FAMILY MEDICINE

## 2018-05-18 PROCEDURE — 82947 ASSAY GLUCOSE BLOOD QUANT: CPT | Performed by: FAMILY MEDICINE

## 2018-05-18 PROCEDURE — 82306 VITAMIN D 25 HYDROXY: CPT | Performed by: FAMILY MEDICINE

## 2018-05-18 PROCEDURE — 84439 ASSAY OF FREE THYROXINE: CPT | Performed by: FAMILY MEDICINE

## 2018-05-18 PROCEDURE — 84443 ASSAY THYROID STIM HORMONE: CPT | Performed by: FAMILY MEDICINE

## 2018-05-22 ENCOUNTER — HOME INFUSION (PRE-WILLOW HOME INFUSION) (OUTPATIENT)
Dept: PHARMACY | Facility: CLINIC | Age: 58
End: 2018-05-22

## 2018-05-23 ENCOUNTER — OFFICE VISIT (OUTPATIENT)
Dept: FAMILY MEDICINE | Facility: CLINIC | Age: 58
End: 2018-05-23
Payer: COMMERCIAL

## 2018-05-23 VITALS
TEMPERATURE: 97.7 F | WEIGHT: 137 LBS | HEART RATE: 89 BPM | SYSTOLIC BLOOD PRESSURE: 94 MMHG | BODY MASS INDEX: 25.21 KG/M2 | OXYGEN SATURATION: 97 % | HEIGHT: 62 IN | DIASTOLIC BLOOD PRESSURE: 64 MMHG | RESPIRATION RATE: 16 BRPM

## 2018-05-23 DIAGNOSIS — Z00.00 ENCOUNTER FOR ROUTINE ADULT HEALTH EXAMINATION WITHOUT ABNORMAL FINDINGS: ICD-10-CM

## 2018-05-23 DIAGNOSIS — K52.9 INFLAMMATORY BOWEL DISEASE: ICD-10-CM

## 2018-05-23 DIAGNOSIS — K50.90 CROHN'S DISEASE WITHOUT COMPLICATION, UNSPECIFIED GASTROINTESTINAL TRACT LOCATION (H): ICD-10-CM

## 2018-05-23 DIAGNOSIS — K51.50 LEFT SIDED ULCERATIVE (CHRONIC) COLITIS (H): ICD-10-CM

## 2018-05-23 DIAGNOSIS — G43.009 MIGRAINE WITHOUT AURA AND WITHOUT STATUS MIGRAINOSUS, NOT INTRACTABLE: ICD-10-CM

## 2018-05-23 DIAGNOSIS — Z23 NEED FOR PNEUMOCOCCAL VACCINE: ICD-10-CM

## 2018-05-23 DIAGNOSIS — R79.89 ABNORMAL TSH: ICD-10-CM

## 2018-05-23 DIAGNOSIS — Z23 NEED FOR SHINGLES VACCINE: Primary | ICD-10-CM

## 2018-05-23 DIAGNOSIS — M19.90 INFLAMMATORY ARTHRITIS: ICD-10-CM

## 2018-05-23 PROCEDURE — 99396 PREV VISIT EST AGE 40-64: CPT | Performed by: FAMILY MEDICINE

## 2018-05-23 NOTE — PATIENT INSTRUCTIONS
Mammogram due in November    Call your insurance to discuss coverage of Shingrix (shingles vaccine). Return for nurse only visit or to your pharmacy to receive the vaccine.    Return to receive pneumonia vaccine about 1 month after your Entyvio injection.    Trial Nasacort, 2 sprays in each nostril once daily for at least 2-3 weeks. If this is helping, continue using to control allergies.     Stop your vitamin D and B12 supplement for 1 month. Decrease to 1/4-1/2 amount you are supplementing of each.    Return for lab only visit in 6-8 weeks.    Preventive Health Recommendations  Female Ages 50 - 64    Yearly exam: See your health care provider every year in order to  o Review health changes.   o Discuss preventive care.    o Review your medicines if your doctor has prescribed any.      Get a Pap test every three years (unless you have an abnormal result and your provider advises testing more often).    If you get Pap tests with HPV test, you only need to test every 5 years, unless you have an abnormal result.     You do not need a Pap test if your uterus was removed (hysterectomy) and you have not had cancer.    You should be tested each year for STDs (sexually transmitted diseases) if you're at risk.     Have a mammogram every 1 to 2 years.    Have a colonoscopy at age 50, or have a yearly FIT test (stool test). These exams screen for colon cancer.      Have a cholesterol test every 5 years, or more often if advised.    Have a diabetes test (fasting glucose) every three years. If you are at risk for diabetes, you should have this test more often.     If you are at risk for osteoporosis (brittle bone disease), think about having a bone density scan (DEXA).    Shots: Get a flu shot each year. Get a tetanus shot every 10 years.    Nutrition:     Eat at least 5 servings of fruits and vegetables each day.    Eat whole-grain bread, whole-wheat pasta and brown rice instead of white grains and rice.    Talk to your  provider about Calcium and Vitamin D.     Lifestyle    Exercise at least 150 minutes a week (30 minutes a day, 5 days a week). This will help you control your weight and prevent disease.    Limit alcohol to one drink per day.    No smoking.     Wear sunscreen to prevent skin cancer.     See your dentist every six months for an exam and cleaning.    See your eye doctor every 1 to 2 years.

## 2018-05-23 NOTE — PROGRESS NOTES
SUBJECTIVE:   CC: Ryan Cruz is an 57 year old woman who presents for preventive health visit.     Healthy Habits:  Answers for HPI/ROS submitted by the patient on 5/20/2018   Annual Exam:  Getting at least 3 servings of Calcium per day:: Yes  Bi-annual eye exam:: Yes  Dental care twice a year:: Yes  Sleep apnea or symptoms of sleep apnea:: None  Diet:: Gluten-free/reduced  Frequency of exercise:: 2-3 days/week  Taking medications regularly:: Yes  Medication side effects:: None  Additional concerns today:: No  PHQ-2 Score: 0  Duration of exercise:: 45-60 minutes      GI: Patient had significant GI flare last fall. She lost about 20 lbs with this, had a flex sig last fall and scheduled to have colonoscopy coming up. She has gained this weight back and sx's have completely resolved. No recent GI sx's or concerns relating to Crohn's disease.  -receiving Entyvio injections every 8 weeks to control GI sx's. These injections cause fatigue but o/w has been tolerating.     Others:   -Ptoticed this morning she has a growth in the inside of her mouth.  -She felt fatigued with previous pneumonia injection but is inquiring about another one that is needed  -Joints have been doing well until recently her hands have been swelling. Left shoulder is also sore. She has f/u with rheumatology in one week.    Headaches: have been occurring more frequently, sinus area/right eye. She is using sudafed for allergies plus either Claritin or Zyrtec. Steroid nasal sprays cause gagging/nausea although she is able to use saline sprays.   Denies: changes in vision/speech, gait abnormality, focal weakness, numbness/tingling, headache changing in sensation,   -Skin is very dry. She is following with dermatology and applying lotion to her skin daily.      Reviewing labs:  -pt d/c'ed vit D 2 weeks ago as she forgot it in [place]. She thinks Vitamin D may be elevated due to spending the winter in [place] and supplementing.  -Doesn't thinks she  has abnormal thyroid sx's.      Today's PHQ-2 Score:   PHQ-2 ( 1999 Pf[employer]) 5/23/2018 5/20/2018   Q1: Little interest or pleasure in doing things 0 0   Q2: Feeling down, depressed or hopeless 0 0   PHQ-2 Score 0 0   Q1: Little interest or pleasure in doing things - Not at all   Q2: Feeling down, depressed or hopeless - Not at all   PHQ-2 Score - 0       Abuse: Current or Past(Physical, Sexual or Emotional)- No  Do you feel safe in your environment - Yes    Social History   Substance Use Topics     Smoking status: Never Smoker     Smokeless tobacco: Never Used     Alcohol use No     If you drink alcohol do you typically have >3 drinks per day or >7 drinks per week? Occasionally                      Reviewed orders with patient.  Reviewed health maintenance and updated orders accordingly - Yes  Labs reviewed in EPIC  BP Readings from Last 3 Encounters:   05/23/18 94/64   06/22/17 111/67   06/02/17 118/62    Wt Readings from Last 3 Encounters:   05/23/18 62.1 kg (137 lb)   06/22/17 71.1 kg (156 lb 11.2 oz)   06/02/17 71.1 kg (156 lb 11.2 oz)                  Patient Active Problem List   Diagnosis     Migraine without aura and without status migrainosus, not intractable     Allergic rhinitis     Inflammatory arthritis     Crohns disease (H)     CARDIOVASCULAR SCREENING; LDL GOAL LESS THAN 160     Health Care Home     Inflammatory bowel disease     Pulmonary nodule     Encounter for dietary counseling and surveillance     Left sided ulcerative (chronic) colitis (H)     Overweight (BMI 25.0-29.9)     Advance care planning     Past Surgical History:   Procedure Laterality Date     CARPAL TUNNEL RELEASE RT/LT  2006     HYSTERECTOMY, SUPRACERVICAL LAPAROSCOPIC  2008       Social History   Substance Use Topics     Smoking status: Never Smoker     Smokeless tobacco: Never Used     Alcohol use No     Family History   Problem Relation Age of Onset     Arthritis Mother      rheumatoid arthritis     CANCER Mother      stomach      "GASTROINTESTINAL DISEASE Father       at 68 from Crohn's     DIABETES Father      Arthritis Sister      Rheumatoid arthritis     CEREBROVASCULAR DISEASE Maternal Grandfather      CEREBROVASCULAR DISEASE Paternal Grandfather            Patient over age 50, mutual decision to screen reflected in health maintenance.    Pertinent mammograms are reviewed under the imaging tab.  History of abnormal Pap smear: Status post hysterectomy. Pap still indicated.    Reviewed and updated as needed this visit by clinical staff  Tobacco  Allergies  Meds  Med Hx  Surg Hx  Fam Hx  Soc Hx        Reviewed and updated as needed this visit by Provider Tobacco  Allergies  Meds  Med Hx  Surg Hx  Fam Hx  Soc Hx               ROS:   ROS: 10 point ROS neg other than the symptoms noted above in the HPI.    This document serves as a record of the services and decisions personally performed and made by Jaelyn Garcia MD. It was created on her behalf by Nakita Corona, a trained medical scribe. The creation of this document is based the provider's statements to the medical scribe.  Nakita Corona May 23, 2018 11:01 AM      OBJECTIVE:   BP 94/64 (BP Location: Right arm, Patient Position: Sitting, Cuff Size: Adult Regular)  Pulse 89  Temp 97.7  F (36.5  C) (Oral)  Resp 16  Ht 1.562 m (5' 1.5\")  Wt 62.1 kg (137 lb)  LMP 2014  SpO2 97%  BMI 25.47 kg/m2  EXAM:  GENERAL APPEARANCE: healthy, alert and no distress, overweight  EYES: Eyes grossly normal to inspection, PERRL and conjunctivae and sclerae normal  HENT: ear canals and left TM normal, right TM-with fluid, nose without ulcers or lesions, and mouth- hard white nodule on inside of left lower incisor; oropharynx clear and oral mucous membranes moist  NECK: no adenopathy, no asymmetry, masses, or scars and thyroid normal to palpation  RESP: lungs clear to auscultation - no rales, rhonchi or wheezes  BREAST: normal without masses, tenderness or nipple " discharge and no palpable axillary masses or adenopathy  CV: regular rate and rhythm, normal S1 S2, no S3 or S4, no murmur, click or rub, no peripheral edema and peripheral pulses strong  ABDOMEN: soft, nontender, no hepatosplenomegaly, no masses and bowel sounds normal   (female): normal female external genitalia, normal urethral meatus, vaginal mucosal atrophy noted, normal cervix, and adnexa without masses or abnormal discharge   MS: PIP joints in hands bilaterally swollen and tender  SKIN: Red, scaly thickened skin along interdigital areas on 2nd>> 3rd and 4th fingers bilaterally  PSYCH: mentation appears normal and affect normal/bright    Component      Latest Ref Rng & Units 9/9/2016 5/24/2017 5/18/2018   Cholesterol      <200 mg/dL   179   Triglycerides      <150 mg/dL   43   HDL Cholesterol      >49 mg/dL   86   LDL Cholesterol Calculated      <100 mg/dL   84   Non HDL Cholesterol      <130 mg/dL   93   TSH      0.40 - 4.00 mU/L 1.90 2.52 4.62 (H)   Vitamin B12      193 - 986 pg/mL 3287 (H)  4034 (H)   Vitamin D Deficiency screening      20 - 75 ug/L >96 (H) . . .  107 (H)   ALT      5 - 35 IU/L  23    AST      5 - 34 U/L  27    T4 Free      0.76 - 1.46 ng/dL   1.15     ASSESSMENT/PLAN:   1. Encounter for routine adult health examination without abnormal findings    2. Need for shingles vaccine  pt will return for vaccines in between Entyvio injections.  - HC ZOSTER VACCINE RECOMBINANT ADJUVANTED IM NJX; Future    3. Need for pneumococcal vaccine  as above #2  - PNEUMOCOCCAL CONJ VACCINE 13 VALENT IM; Future    4. Inflammatory bowel disease  5. Crohn's disease without complication, unspecified gastrointestinal tract location (H)  6. Left sided ulcerative (chronic) colitis (H)  Stable currently Pt is following with GI and receiving Entyvia injections to control GI sx's.     7. Migraine without aura and without status migrainosus, not intractable  worsening likely due to flare of allergies. Advised  continuing with saline sprays and trial Nasacort which may have less scent, as Flonase causes gagging/nausea. She is to f/u if sx's are not improving or worsening.    8. Abnormal TSH   Pt declines thyroid sx's. Will recheck labs in 6-8 weeks. Plan to treat if TSH is still abnormal.  - **TSH with free T4 reflex FUTURE anytime; Future  - Anti thyroglobulin antibody; Future  - Thyroid peroxidase antibody; Future    9. Inflammatory arthritis  following with rheumatology.       10. Vitamin levels :Discussed holding vit D and B12 supplements for 1 month, then decreasing to 1/4-1/2 amount you are supplementing of each.    Discussed shingrix vaccine with pt. They will return later this year to receive the vaccine.      Patient Instructions   Mammogram due in November    Call your insurance to discuss coverage of Shingrix (shingles vaccine). Return for nurse only visit or to your pharmacy to receive the vaccine.    Return to receive pneumonia vaccine about 1 month after your Entyvio injections.    Trial Nasacort, 2 sprays in each nostril once daily for at least 2-3 weeks. If this is helping, continue using to control allergies.    Stop your vitamin D and B12 supplement for 1 month. Decrease to 1/4-1/2 amount you are supplementing of each.    Return for lab only visit in 6-8 weeks.    Preventive Health Recommendations  Female Ages 50 - 64    Yearly exam: See your health care provider every year in order to  o Review health changes.   o Discuss preventive care.    o Review your medicines if your doctor has prescribed any.      Get a Pap test every three years (unless you have an abnormal result and your provider advises testing more often).    If you get Pap tests with HPV test, you only need to test every 5 years, unless you have an abnormal result.     You do not need a Pap test if your uterus was removed (hysterectomy) and you have not had cancer.    You should be tested each year for STDs (sexually transmitted diseases) if  "you're at risk.     Have a mammogram every 1 to 2 years.    Have a colonoscopy at age 50, or have a yearly FIT test (stool test). These exams screen for colon cancer.      Have a cholesterol test every 5 years, or more often if advised.    Have a diabetes test (fasting glucose) every three years. If you are at risk for diabetes, you should have this test more often.     If you are at risk for osteoporosis (brittle bone disease), think about having a bone density scan (DEXA).    Shots: Get a flu shot each year. Get a tetanus shot every 10 years.    Nutrition:     Eat at least 5 servings of fruits and vegetables each day.    Eat whole-grain bread, whole-wheat pasta and brown rice instead of white grains and rice.    Talk to your provider about Calcium and Vitamin D.     Lifestyle    Exercise at least 150 minutes a week (30 minutes a day, 5 days a week). This will help you control your weight and prevent disease.    Limit alcohol to one drink per day.    No smoking.     Wear sunscreen to prevent skin cancer.     See your dentist every six months for an exam and cleaning.    See your eye doctor every 1 to 2 years.        COUNSELING:   Reviewed preventive health counseling, as reflected in patient instructions       Regular exercise       Healthy diet/nutrition       Vision screening       Hearing screening       Immunizations        Alcohol Use       Colon cancer screening         reports that she has never smoked. She has never used smokeless tobacco.    Estimated body mass index is 25.47 kg/(m^2) as calculated from the following:    Height as of this encounter: 1.562 m (5' 1.5\").    Weight as of this encounter: 62.1 kg (137 lb).   Weight management plan: Discussed healthy diet and exercise guidelines and patient will follow up in 12 months in clinic to re-evaluate.    Counseling Resources:  ATP IV Guidelines  Pooled Cohorts Equation Calculator  Breast Cancer Risk Calculator  FRAX Risk Assessment  ICSI Preventive " Guidelines  Dietary Guidelines for Americans, 2010  USDA's MyPlate  ASA Prophylaxis  Lung CA Screening    The information in this document, created by the medical scribe for me, accurately reflects the services I personally performed and the decisions made by me. I have reviewed and approved this document for accuracy.   MD Jaelyn Olvera MD  [facility]

## 2018-05-23 NOTE — MR AVS SNAPSHOT
After Visit Summary   5/23/2018    Ryan Cruz    MRN: [id number]           Patient Information     Date Of Birth          1960        Visit Information        Provider Department      5/23/2018 11:00 AM Jaelyn Garcia MD [facility]        Today's Diagnoses     Need for shingles vaccine    -  1    Encounter for routine adult health examination without abnormal findings        Need for pneumococcal vaccine        Inflammatory bowel disease        Crohn's disease without complication, unspecified gastrointestinal tract location (H)        Left sided ulcerative (chronic) colitis (H)        Migraine without aura and without status migrainosus, not intractable        Abnormal TSH        Inflammatory arthritis          Care Instructions    Mammogram due in November    Call your insurance to discuss coverage of Shingrix (shingles vaccine). Return for nurse only visit or to your pharmacy to receive the vaccine.    Return to receive pneumonia vaccine about 1 month after your Entyvio injection.    Trial Nasacort, 2 sprays in each nostril once daily for at least 2-3 weeks. If this is helping, continue using to control allergies.     Stop your vitamin D and B12 supplement for 1 month. Decrease to 1/4-1/2 amount you are supplementing of each.    Return for lab only visit in 6-8 weeks.    Preventive Health Recommendations  Female Ages 50 - 64    Yearly exam: See your health care provider every year in order to  o Review health changes.   o Discuss preventive care.    o Review your medicines if your doctor has prescribed any.      Get a Pap test every three years (unless you have an abnormal result and your provider advises testing more often).    If you get Pap tests with HPV test, you only need to test every 5 years, unless you have an abnormal result.     You do not need a Pap test if your uterus was removed (hysterectomy) and you have not had cancer.    You should be tested  each year for STDs (sexually transmitted diseases) if you're at risk.     Have a mammogram every 1 to 2 years.    Have a colonoscopy at age 50, or have a yearly FIT test (stool test). These exams screen for colon cancer.      Have a cholesterol test every 5 years, or more often if advised.    Have a diabetes test (fasting glucose) every three years. If you are at risk for diabetes, you should have this test more often.     If you are at risk for osteoporosis (brittle bone disease), think about having a bone density scan (DEXA).    Shots: Get a flu shot each year. Get a tetanus shot every 10 years.    Nutrition:     Eat at least 5 servings of fruits and vegetables each day.    Eat whole-grain bread, whole-wheat pasta and brown rice instead of white grains and rice.    Talk to your provider about Calcium and Vitamin D.     Lifestyle    Exercise at least 150 minutes a week (30 minutes a day, 5 days a week). This will help you control your weight and prevent disease.    Limit alcohol to one drink per day.    No smoking.     Wear sunscreen to prevent skin cancer.     See your dentist every six months for an exam and cleaning.    See your eye doctor every 1 to 2 years.            Follow-ups after your visit        Future tests that were ordered for you today     Open Future Orders        Priority Expected Expires Ordered    **TSH with free T4 reflex FUTURE anytime Routine 5/23/2018 5/23/2019 5/23/2018    Anti thyroglobulin antibody Routine  5/23/2019 5/23/2018    Thyroid peroxidase antibody Routine  5/23/2019 5/23/2018    HC ZOSTER VACCINE RECOMBINANT ADJUVANTED IM NJX Routine  5/23/2019 5/23/2018    PNEUMOCOCCAL CONJ VACCINE 13 VALENT IM Routine  5/23/2019 5/23/2018            Who to contact     If you have questions or need follow up information about today's clinic visit or your schedule please contact [facility] directly at [phone].  Normal or non-critical lab and imaging results will be  "communicated to you by flo.dohart, letter or phone within 4 business days after the clinic has received the results. If you do not hear from us within 7 days, please contact the clinic through iTMan or phone. If you have a critical or abnormal lab result, we will notify you by phone as soon as possible.  Submit refill requests through iTMan or call your pharmacy and they will forward the refill request to us. Please allow 3 business days for your refill to be completed.          Additional Information About Your Visit        iTMan Information     iTMan gives you secure access to your electronic health record. If you see a primary care provider, you can also send messages to your care team and make appointments. If you have questions, please call your primary care clinic.  If you do not have a primary care provider, please call [phone] and they will assist you.        Care EveryWhere ID     This is your Care EveryWhere ID. This could be used by other organizations to access your [place] medical records  [id number]        Your Vitals Were     Pulse Temperature Respirations Height Last Period Pulse Oximetry    89 97.7  F (36.5  C) (Oral) 16 1.562 m (5' 1.5\") 04/17/2014 97%    BMI (Body Mass Index)                   25.47 kg/m2            Blood Pressure from Last 3 Encounters:   05/23/18 94/64   06/22/17 111/67   06/02/17 118/62    Weight from Last 3 Encounters:   05/23/18 62.1 kg (137 lb)   06/22/17 71.1 kg (156 lb 11.2 oz)   06/02/17 71.1 kg (156 lb 11.2 oz)               Primary Care Provider Office Phone # Fax #    Jaelyn Garcia [phone] [address]  [place] [address]        Equal Access to Services     CLAIRE FARLEY : Mikal Maya, sushma blackburn, emile kaalmahoa arauz. So [facility] [phone].    ATENCIÓN: Si habla español, tiene a sommers disposición servicios gratuitos de asistencia lingüística. " Vivian al [phone].    We comply with applicable federal civil rights laws and [place] laws. We do not discriminate on the basis of race, color, national origin, age, disability, sex, sexual orientation, or gender identity.            Thank you!     Thank you for choosing [facility]  for your care. Our goal is always to provide you with excellent care. Hearing back from our patients is one way we can continue to improve our services. Please take a few minutes to complete the written survey that you may receive in the mail after your visit with us. Thank you!             Your Updated Medication List - Protect others around you: Learn how to safely use, store and throw away your medicines at www.disposemymeds.org.          This list is accurate as of 5/23/18 11:28 AM.  Always use your most recent med list.                   Brand Name Dispense Instructions for use Diagnosis    balsalazide 750 MG capsule    COLAZAL     Take 2,250 mg by mouth 3 times daily.        CALCIUM 600 + D PO      Take 1 tablet by mouth daily.        CANASA 1000 MG Suppository   Generic drug:  mesalamine      Place 1 suppository rectally At Bedtime.        co-enzyme Q-10 100 MG Caps capsule      Take 1 capsule by mouth daily.        ENTYVIO 60 MG/ML injection   Generic drug:  vedolizumab           FOLIC ACID PO      Take 2 mg by mouth daily. Takes 2 x 1 mg tabs        GLUTAMINE PO      Take 2,250 mg by mouth daily.        hydroxychloroquine 200 MG tablet    PLAQUENIL     Take 200 mg by mouth 2 times daily.        hyoscyamine 0.125 MG sublingual tablet    LEVSIN/SL          leucovorin 5 MG tablet    WELLCOVORIN     Take 5 mg by mouth daily.        LYSINE      Take  by mouth every morning. Unknown strength        METHOTREXATE PO      Take 17.5 mg by mouth once a week. Takes on Sundays 2.5 mg x 7 tabs= 17.5 mg        PEPPERMINT OIL      Take 1 capsule by mouth daily.        polyethylene glycol powder    MIRALAX/GLYCOLAX           PROBIOTIC PRODUCT PO      Take 1 capsule by mouth daily.        pseudoePHEDrine 120 MG 12 hr tablet    SUDAFED     Take 120 mg by mouth every 12 hours.        rizatriptan 10 MG ODT tab    MAXALT-MLT    6 tablet    Take 1 tablet with onset of migraine, may repeat once after 2 hrs. Do not exceed 3 tabs in 24 hrs.    Migraine without aura and without status migrainosus, not intractable       SM VITAMIN B12 TR 2000 MCG Tbcr   Generic drug:  Cyanocobalamin      Take 1 tablet by mouth daily.        * UNABLE TO FIND      MEDICATION NAME: Estrofactor - take 2 tablets by mouth twice daily        * UNABLE TO FIND      MEDICATION NAME: Zinc liquid - 1 tsp daily        VITAMIN D3 PO      Take 5,000 Units by mouth daily.        ZYRTEC 10 MG tablet   Generic drug:  cetirizine      Take 10 mg by mouth daily. One a day        * Notice:  This list has 2 medication(s) that are the same as other medications prescribed for you. Read the directions carefully, and ask your doctor or other care provider to review them with you.

## 2018-05-23 NOTE — PROGRESS NOTES
This is a recent snapshot of the patient's [facility] medical record.  For current drug dose and complete information and questions, call [phone] or In Basket pool, [facility] home infusion ([id number])  CSN Number:  [id number]

## 2018-05-30 ENCOUNTER — HOME INFUSION (PRE-WILLOW HOME INFUSION) (OUTPATIENT)
Dept: PHARMACY | Facility: CLINIC | Age: 58
End: 2018-05-30

## 2018-05-31 ENCOUNTER — HOME INFUSION (PRE-WILLOW HOME INFUSION) (OUTPATIENT)
Dept: PHARMACY | Facility: CLINIC | Age: 58
End: 2018-05-31

## 2018-05-31 ENCOUNTER — APPOINTMENT (OUTPATIENT)
Dept: LAB | Facility: CLINIC | Age: 58
End: 2018-05-31
Attending: PHYSICIAN ASSISTANT
Payer: COMMERCIAL

## 2018-06-01 ENCOUNTER — APPOINTMENT (OUTPATIENT)
Dept: LAB | Facility: CLINIC | Age: 58
End: 2018-06-01
Attending: PHYSICIAN ASSISTANT
Payer: COMMERCIAL

## 2018-06-04 NOTE — PROGRESS NOTES
This is a recent snapshot of the patient's [facility] medical record.  For current drug dose and complete information and questions, call [phone] or In Basket pool, [facility]v home infusion ([id number])  CSN Number:  [id number]

## 2018-06-08 NOTE — PROGRESS NOTES
This is a recent snapshot of the patient's [facility] medical record.  For current drug dose and complete information and questions, call [phone] or In Basket pool, [facility]v home infusion ([id number])  CSN Number:  [id number]

## 2018-06-13 ENCOUNTER — MYC MEDICAL ADVICE (OUTPATIENT)
Dept: FAMILY MEDICINE | Facility: CLINIC | Age: 58
End: 2018-06-13

## 2018-06-13 DIAGNOSIS — G43.009 MIGRAINE WITHOUT AURA AND WITHOUT STATUS MIGRAINOSUS, NOT INTRACTABLE: ICD-10-CM

## 2018-06-13 RX ORDER — RIZATRIPTAN BENZOATE 10 MG/1
TABLET, ORALLY DISINTEGRATING ORAL
Qty: 6 TABLET | Refills: 3 | Status: SHIPPED | OUTPATIENT
Start: 2018-06-13 | End: 2020-06-02

## 2018-06-14 ENCOUNTER — DOCUMENTATION ONLY (OUTPATIENT)
Dept: LAB | Facility: CLINIC | Age: 58
End: 2018-06-14

## 2018-06-14 DIAGNOSIS — K50.90 CROHN'S DISEASE WITHOUT COMPLICATION, UNSPECIFIED GASTROINTESTINAL TRACT LOCATION (H): Primary | ICD-10-CM

## 2018-06-14 NOTE — PROGRESS NOTES
Please place or confirm orders for upcoming lab appointment on 06/15/2018 Patient coming for vitamin B and D recheck.    Thank You  Faye JONAS CMA.

## 2018-06-15 DIAGNOSIS — R79.89 ABNORMAL TSH: ICD-10-CM

## 2018-06-15 DIAGNOSIS — K50.90 CROHN'S DISEASE WITHOUT COMPLICATION, UNSPECIFIED GASTROINTESTINAL TRACT LOCATION (H): ICD-10-CM

## 2018-06-15 LAB
DEPRECATED CALCIDIOL+CALCIFEROL SERPL-MC: 89 UG/L (ref 20–75)
T4 FREE SERPL-MCNC: 1.04 NG/DL (ref 0.76–1.46)
TSH SERPL DL<=0.005 MIU/L-ACNC: 4.83 MU/L (ref 0.4–4)
VIT B12 SERPL-MCNC: 1649 PG/ML (ref 193–986)

## 2018-06-15 PROCEDURE — 82607 VITAMIN B-12: CPT | Performed by: FAMILY MEDICINE

## 2018-06-15 PROCEDURE — 36415 COLL VENOUS BLD VENIPUNCTURE: CPT | Performed by: FAMILY MEDICINE

## 2018-06-15 PROCEDURE — 86376 MICROSOMAL ANTIBODY EACH: CPT | Performed by: FAMILY MEDICINE

## 2018-06-15 PROCEDURE — 86800 THYROGLOBULIN ANTIBODY: CPT | Performed by: FAMILY MEDICINE

## 2018-06-15 PROCEDURE — 82306 VITAMIN D 25 HYDROXY: CPT | Performed by: FAMILY MEDICINE

## 2018-06-15 PROCEDURE — 84439 ASSAY OF FREE THYROXINE: CPT | Performed by: FAMILY MEDICINE

## 2018-06-15 PROCEDURE — 84443 ASSAY THYROID STIM HORMONE: CPT | Performed by: FAMILY MEDICINE

## 2018-06-18 LAB
THYROGLOB AB SERPL IA-ACNC: <20 IU/ML (ref 0–40)
THYROPEROXIDASE AB SERPL-ACNC: 15 IU/ML

## 2018-06-28 ENCOUNTER — TRANSFERRED RECORDS (OUTPATIENT)
Dept: HEALTH INFORMATION MANAGEMENT | Facility: CLINIC | Age: 58
End: 2018-06-28

## 2018-07-02 ENCOUNTER — APPOINTMENT (OUTPATIENT)
Dept: LAB | Facility: CLINIC | Age: 58
End: 2018-07-02
Attending: PHYSICIAN ASSISTANT
Payer: COMMERCIAL

## 2018-07-09 ENCOUNTER — OFFICE VISIT (OUTPATIENT)
Dept: FAMILY MEDICINE | Facility: CLINIC | Age: 58
End: 2018-07-09
Payer: COMMERCIAL

## 2018-07-09 VITALS
WEIGHT: 140 LBS | HEIGHT: 62 IN | DIASTOLIC BLOOD PRESSURE: 62 MMHG | TEMPERATURE: 98.1 F | HEART RATE: 91 BPM | SYSTOLIC BLOOD PRESSURE: 98 MMHG | OXYGEN SATURATION: 100 % | BODY MASS INDEX: 25.76 KG/M2

## 2018-07-09 DIAGNOSIS — J01.00 ACUTE MAXILLARY SINUSITIS, RECURRENCE NOT SPECIFIED: Primary | ICD-10-CM

## 2018-07-09 DIAGNOSIS — K51.50 LEFT SIDED ULCERATIVE (CHRONIC) COLITIS (H): ICD-10-CM

## 2018-07-09 DIAGNOSIS — R07.0 THROAT PAIN: ICD-10-CM

## 2018-07-09 LAB
DEPRECATED S PYO AG THROAT QL EIA: NORMAL
SPECIMEN SOURCE: NORMAL

## 2018-07-09 PROCEDURE — 87081 CULTURE SCREEN ONLY: CPT | Performed by: FAMILY MEDICINE

## 2018-07-09 PROCEDURE — 87880 STREP A ASSAY W/OPTIC: CPT | Performed by: FAMILY MEDICINE

## 2018-07-09 PROCEDURE — 99214 OFFICE O/P EST MOD 30 MIN: CPT | Performed by: FAMILY MEDICINE

## 2018-07-09 RX ORDER — DOXYCYCLINE 100 MG/1
100 CAPSULE ORAL 2 TIMES DAILY
Qty: 20 CAPSULE | Refills: 0 | Status: SHIPPED | OUTPATIENT
Start: 2018-07-09 | End: 2019-05-13

## 2018-07-09 ASSESSMENT — PAIN SCALES - GENERAL: PAINLEVEL: SEVERE PAIN (7)

## 2018-07-09 NOTE — PATIENT INSTRUCTIONS
Start doxycyline 2x daily.  I would recommended taking a daily probiotic or eating yogurt while on the antibiotic to help reduce the possible side effects of antibiotic  Notify GI you are taking antibiotics.   If you are not improving in about 5 days let me know

## 2018-07-09 NOTE — MR AVS SNAPSHOT
After Visit Summary   7/9/2018    Ryan Cruz    MRN: [id number]           Patient Information     Date Of Birth          1960        Visit Information        Provider Department      7/9/2018 3:00 PM Jaelyn Garcia MD [facility]        Today's Diagnoses     Acute maxillary sinusitis, recurrence not specified    -  1    Throat pain        Left sided ulcerative (chronic) colitis (H)          Care Instructions    Start doxycyline   I would recommended taking a daily probiotic or eating yogurt while on the antibiotic to help reduce the possible side effects of antibiotic  Notify GI you are taking antibiotics.   If you are not improving in about 5 days let me know          Follow-ups after your visit        Your next 10 appointments already scheduled     Jul 10, 2018  1:20 PM CDT   Nurse Only with BA ANCILLARY   [facility] ([facility])    [address] [address] [phone]              Who to contact     If you have questions or need follow up information about today's clinic visit or your schedule please contact [facility] directly at [phone].  Normal or non-critical lab and imaging results will be communicated to you by [employer], letter or phone within 4 business days after the clinic has received the results. If you do not hear from us within 7 days, please contact the clinic through [employer] or phone. If you have a critical or abnormal lab result, we will notify you by phone as soon as possible.  Submit refill requests through [employer] or call your pharmacy and they will forward the refill request to us. Please allow 3 business days for your refill to be completed.          Additional Information About Your Visit        [employer] Information     [employer] gives you secure access to your electronic health record. If you see a primary care provider, you can also send messages to your care  "team and make appointments. If you have questions, please call your primary care clinic.  If you do not have a primary care provider, please call [phone] and they will assist you.        Care EveryWhere ID     This is your Care EveryWhere ID. This could be used by other organizations to access your [place] medical records  [id number]        Your Vitals Were     Pulse Temperature Height Last Period Pulse Oximetry Breastfeeding?    91 98.1  F (36.7  C) (Oral) 1.562 m (5' 1.5\") 04/17/2014 (Exact Date) 100% No    BMI (Body Mass Index)                   26.02 kg/m2            Blood Pressure from Last 3 Encounters:   07/09/18 98/62   05/23/18 94/64   06/22/17 111/67    Weight from Last 3 Encounters:   07/09/18 63.5 kg (140 lb)   05/23/18 62.1 kg (137 lb)   06/22/17 71.1 kg (156 lb 11.2 oz)              We Performed the Following     Beta strep group A culture     Rapid strep screen          Today's Medication Changes          These changes are accurate as of 7/9/18  4:10 PM.  If you have any questions, ask your nurse or doctor.               Start taking these medicines.        Dose/Directions    doxycycline 100 MG capsule   Commonly known as:  VIBRAMYCIN   Used for:  Acute maxillary sinusitis, recurrence not specified   Started by:  Jaelyn Garcia MD        Dose:  100 mg   Take 1 capsule (100 mg) by mouth 2 times daily   Quantity:  20 capsule   Refills:  0            Where to get your medicines      These medications were sent to [facility]/pharmacy #[id number] [address] AT [place] [address]  [address] [place] [address]     Phone:  [phone]     doxycycline 100 MG capsule                Primary Care Provider Office Phone # Fax #    Jaelyn Garcia [phone] [address] [address]        Equal Access to Services     CLAIRE FARLEY AH: Mikal pond Somannie, waaxda luqadaha, qaybta janinaalhoa mccarthy " deny lenicolonnie carly cho'aan ah. So [facility] [phone].    ATENCIÓN: Si maria victoria zhang, tiene a sommers disposición servicios gratuitos de asistencia lingüística. Vivian al [phone].    We comply with applicable federal civil rights laws and [place] laws. We do not discriminate on the basis of race, color, national origin, age, disability, sex, sexual orientation, or gender identity.            Thank you!     Thank you for choosing [facility]  for your care. Our goal is always to provide you with excellent care. Hearing back from our patients is one way we can continue to improve our services. Please take a few minutes to complete the written survey that you may receive in the mail after your visit with us. Thank you!             Your Updated Medication List - Protect others around you: Learn how to safely use, store and throw away your medicines at www.disposemymeds.org.          This list is accurate as of 7/9/18  4:10 PM.  Always use your most recent med list.                   Brand Name Dispense Instructions for use Diagnosis    balsalazide 750 MG capsule    COLAZAL     Take 2,250 mg by mouth 3 times daily.        CALCIUM 600 + D PO      Take 1 tablet by mouth daily.        CANASA 1000 MG Suppository   Generic drug:  mesalamine      Place 1 suppository rectally At Bedtime.        co-enzyme Q-10 100 MG Caps capsule      Take 1 capsule by mouth daily.        doxycycline 100 MG capsule    VIBRAMYCIN    20 capsule    Take 1 capsule (100 mg) by mouth 2 times daily    Acute maxillary sinusitis, recurrence not specified       ENTYVIO 60 MG/ML injection   Generic drug:  vedolizumab           FOLIC ACID PO      Take 2 mg by mouth daily. Takes 2 x 1 mg tabs        GLUTAMINE PO      Take 2,250 mg by mouth daily.        hydroxychloroquine 200 MG tablet    PLAQUENIL     Take 200 mg by mouth 2 times daily.        hyoscyamine 0.125 MG sublingual tablet    LEVSIN/SL          leucovorin 5 MG tablet     WELLCOVORIN     Take 5 mg by mouth daily.        LYSINE      Take  by mouth every morning. Unknown strength        METHOTREXATE PO      Take 17.5 mg by mouth once a week. Takes on Sundays 2.5 mg x 7 tabs= 17.5 mg        PEPPERMINT OIL      Take 1 capsule by mouth daily.        polyethylene glycol powder    MIRALAX/GLYCOLAX          PROBIOTIC PRODUCT PO      Take 1 capsule by mouth daily.        pseudoePHEDrine 120 MG 12 hr tablet    SUDAFED     Take 120 mg by mouth every 12 hours.        rizatriptan 10 MG ODT tab    MAXALT-MLT    6 tablet    Take 1 tablet with onset of migraine, may repeat once after 2 hrs. Do not exceed 3 tabs in 24 hrs.    Migraine without aura and without status migrainosus, not intractable       SM VITAMIN B12 TR 2000 MCG Tbcr   Generic drug:  Cyanocobalamin      Take 1 tablet by mouth daily.        * UNABLE TO FIND      MEDICATION NAME: Estrofactor - take 2 tablets by mouth twice daily        * UNABLE TO FIND      MEDICATION NAME: Zinc liquid - 1 tsp daily        VITAMIN D3 PO      Take 5,000 Units by mouth daily.        ZYRTEC 10 MG tablet   Generic drug:  cetirizine      Take 10 mg by mouth daily. One a day        * Notice:  This list has 2 medication(s) that are the same as other medications prescribed for you. Read the directions carefully, and ask your doctor or other care provider to review them with you.

## 2018-07-09 NOTE — PROGRESS NOTES
SUBJECTIVE:   Ryan Cruz is a 58 year old female who presents to clinic today for the following health issues:      Acute Illness   Acute illness concerns: Swollen glands  Onset: 4 days    Fever: YES    Chills/Sweats: YES    Headache (location?): YES    Sinus Pressure:YES    Conjunctivitis:  YES: both eye discharge    Ear Pain: YES: both    Rhinorrhea: YES    Congestion: YES    Sore Throat: YES- not able to swallow started today     Cough: YES-productive of clear sputum, yellow sputum and green sputum    Wheeze: no     Decreased Appetite: YES    Nausea: YES    Vomiting: no     Diarrhea:  YES    Dysuria/Freq.: no     Fatigue/Achiness: YES    Sick/Strep Exposure: no      Therapies Tried and outcome: Mucinex with some relief      Pt has had maxillary sinus pressure, headaches, and pain in the jaw for a few weeks. She noticed an enlarged left submandibular lymph node about 4 days ag and has been feeling more poorly recently. Ryan has been trying to push fluids, use mati pot, and treat with mucinex. She took an aleve this morning given the discomfort.. Pt has pain with swallowing and a slight cough, as well as feeling hot and cold. Temp has raised by one degree as of yesterday from 96 to 97 per her report.        Following with GI tomorrow given substantial inflammation on recent colonoscopy. Noted hypotension with her colonoscopy.       Problem list and histories reviewed & adjusted, as indicated.  Additional history: as documented    Patient Active Problem List   Diagnosis     Migraine without aura and without status migrainosus, not intractable     Allergic rhinitis     Inflammatory arthritis     Crohns disease (H)     CARDIOVASCULAR SCREENING; LDL GOAL LESS THAN 160     Health Care Home     Inflammatory bowel disease     Pulmonary nodule     Encounter for dietary counseling and surveillance     Left sided ulcerative (chronic) colitis (H)     Overweight (BMI 25.0-29.9)     Advance care planning     Past  Surgical History:   Procedure Laterality Date     CARPAL TUNNEL RELEASE RT/LT  2006     HYSTERECTOMY, SUPRACERVICAL LAPAROSCOPIC  2008       Social History   Substance Use Topics     Smoking status: Never Smoker     Smokeless tobacco: Never Used     Alcohol use No     Family History   Problem Relation Age of Onset     Arthritis Mother      rheumatoid arthritis     Cancer Mother      stomach     GASTROINTESTINAL DISEASE Father       at 68 from Crohn's     Diabetes Father      Arthritis Sister      Rheumatoid arthritis     Cerebrovascular Disease Maternal Grandfather      Cerebrovascular Disease Paternal Grandfather          Current Outpatient Prescriptions   Medication Sig Dispense Refill     balsalazide (COLAZAL) 750 MG capsule Take 2,250 mg by mouth 3 times daily.       Calcium Carbonate-Vitamin D (CALCIUM 600 + D OR) Take 1 tablet by mouth daily.       cetirizine (ZYRTEC) 10 MG tablet Take 10 mg by mouth daily. One a day       Cholecalciferol (VITAMIN D3 PO) Take 5,000 Units by mouth daily.       co-enzyme Q-10 (COQ10) 100 MG CAPS Take 1 capsule by mouth daily.       Cyanocobalamin ( VITAMIN B12 TR) 2000 MCG TBCR Take 1 tablet by mouth daily.       FOLIC ACID PO Take 2 mg by mouth daily. Takes 2 x 1 mg tabs       GLUTAMINE PO Take 2,250 mg by mouth daily.       hydroxychloroquine (PLAQUENIL) 200 MG tablet Take 200 mg by mouth 2 times daily.       hyoscyamine (LEVSIN/SL) 0.125 MG SL tablet   0     leucovorin (WELLCOVORIN) 5 MG tablet Take 5 mg by mouth daily.       LYSINE Take  by mouth every morning. Unknown strength       mesalamine (CANASA) 1000 MG suppository Place 1 suppository rectally At Bedtime.       Methotrexate Sodium (METHOTREXATE PO) Take 17.5 mg by mouth once a week. Takes on Sundays  2.5 mg x 7 tabs= 17.5 mg       PEPPERMINT OIL Take 1 capsule by mouth daily.       polyethylene glycol (MIRALAX/GLYCOLAX) powder   4     PROBIOTIC PRODUCT PO Take 1 capsule by mouth daily.       pseudoePHEDrine  "(SUDAFED) 120 MG 12 hr tablet Take 120 mg by mouth every 12 hours.       rizatriptan (MAXALT-MLT) 10 MG ODT tab Take 1 tablet with onset of migraine, may repeat once after 2 hrs. Do not exceed 3 tabs in 24 hrs. 6 tablet 3     UNABLE TO FIND MEDICATION NAME: Estrofactor - take 2 tablets by mouth twice daily       UNABLE TO FIND MEDICATION NAME: Zinc liquid - 1 tsp daily       Vedolizumab (ENTYVIO) 60 MG/ML injection        Allergies   Allergen Reactions     Penicillins Anaphylaxis     Remicade [Infliximab Injection] Nausea and Vomiting, Hives, Itching and Swelling     Arthritis     Shellfish Allergy Anaphylaxis     6 Mercaptopurine [Mercaptopurine]      Headaches and mouth/throat sores     Bactrim Nausea and Vomiting     Omnicef Nausea and Vomiting     Oxycodone Nausea     Prednisone      Racing heart       Reviewed and updated as needed this visit by clinical staff       Reviewed and updated as needed this visit by Provider Tobacco  Allergies  Meds  Med Hx  Surg Hx  Fam Hx  Soc Hx            ROS:  Constitutional, HEENT, cardiovascular, pulmonary, gi and gu systems are negative, except as otherwise noted.    This document serves as a record of the services and decisions personally performed and made by Jaelyn Garcia MD. It was created on her behalf by Nakita Corona, a trained medical scribe. The creation of this document is based the provider's statements to the medical scribe.  Nakita Corona July 9, 2018 4:01 PM      OBJECTIVE:     BP 98/62 (BP Location: Right arm, Patient Position: Chair, Cuff Size: Adult Regular)  Pulse 91  Temp 98.1  F (36.7  C) (Oral)  Ht 1.562 m (5' 1.5\")  Wt 63.5 kg (140 lb)  LMP 04/17/2014 (Exact Date)  SpO2 100%  Breastfeeding? No  BMI 26.02 kg/m2  Body mass index is 26.02 kg/(m^2).  GENERAL: healthy, alert and no distress, overweight  HENT: ear canals and TM's normal, nose and mouth- posterior oropharynx erythematous without ulcers or lesions  NECK: anterior " submandibular shoddy L>R adenopathy, no masses, or scars and thyroid normal to palpation  RESP: lungs clear to auscultation - no rales, rhonchi or wheezes  CV: regular rate and rhythm, normal S1 S2, no S3 or S4, no murmur, click or rub, no peripheral edema and peripheral pulses strong  SKIN: no suspicious lesions or rashes to visible skin  PSYCH: mentation appears normal, affect normal/bright    Diagnostic Test Results:  Results for orders placed or performed in visit on 07/09/18 (from the past 24 hour(s))   Rapid strep screen   Result Value Ref Range    Specimen Description Throat     Rapid Strep A Screen       NEGATIVE: No Group A streptococcal antigen detected by immunoassay, await culture report.       ASSESSMENT/PLAN:     1. Acute maxillary sinusitis, recurrence not specified  2. Throat pain   given prolonged sx's pt is to start doxycycline BID and continue with mati pot and mucinex. Reviewed probiotic use during abx to prevent diarrhea. Reviewed symptomatic management of symptoms. Patient education provided, including expected course of illness and symptoms that may occur which would require urgent evalution. All questions answered. Patient understands and agrees with plan.  - doxycycline (VIBRAMYCIN) 100 MG capsule; Take 1 capsule (100 mg) by mouth 2 times daily  Dispense: 20 capsule; Refill: 0  - Rapid strep screen  - Beta strep group A culture    3. Left sided ulcerative (chronic) colitis (H)  pt is following with GI tomorrow . She will notify GI about starting abx.      Patient Instructions   Start doxycyline 2x daily.  I would recommended taking a daily probiotic or eating yogurt while on the antibiotic to help reduce the possible side effects of antibiotic  Notify GI you are taking antibiotics.   If you are not improving in about 5 days let me know      The information in this document, created by the medical scribe for me, accurately reflects the services I personally performed and the decisions made  by me. I have reviewed and approved this document for accuracy.   MD Jaelyn Olvera MD  [facility]

## 2018-07-10 ENCOUNTER — TRANSFERRED RECORDS (OUTPATIENT)
Dept: HEALTH INFORMATION MANAGEMENT | Facility: CLINIC | Age: 58
End: 2018-07-10

## 2018-07-10 LAB
BACTERIA SPEC CULT: NORMAL
SPECIMEN SOURCE: NORMAL

## 2018-07-23 ENCOUNTER — HOME INFUSION (PRE-WILLOW HOME INFUSION) (OUTPATIENT)
Dept: PHARMACY | Facility: CLINIC | Age: 58
End: 2018-07-23

## 2018-07-24 NOTE — PROGRESS NOTES
This is a recent snapshot of the patient's [facility] medical record.  For current drug dose and complete information and questions, call [phone] or In Basket pool, fv home infusion ([id number])  CSN Number:  [id number]

## 2018-08-06 ENCOUNTER — APPOINTMENT (OUTPATIENT)
Dept: LAB | Facility: CLINIC | Age: 58
End: 2018-08-06
Attending: PHYSICIAN ASSISTANT
Payer: COMMERCIAL

## 2018-08-06 DIAGNOSIS — K50.90 CROHN'S DISEASE WITHOUT COMPLICATION, UNSPECIFIED GASTROINTESTINAL TRACT LOCATION (H): ICD-10-CM

## 2018-08-06 DIAGNOSIS — R79.89 ABNORMAL TSH: ICD-10-CM

## 2018-08-06 LAB — TSH SERPL DL<=0.005 MIU/L-ACNC: 3.64 MU/L (ref 0.4–4)

## 2018-08-06 PROCEDURE — 36415 COLL VENOUS BLD VENIPUNCTURE: CPT | Performed by: FAMILY MEDICINE

## 2018-08-06 PROCEDURE — 84443 ASSAY THYROID STIM HORMONE: CPT | Performed by: FAMILY MEDICINE

## 2018-08-06 PROCEDURE — 86376 MICROSOMAL ANTIBODY EACH: CPT | Performed by: FAMILY MEDICINE

## 2018-08-06 PROCEDURE — 86800 THYROGLOBULIN ANTIBODY: CPT | Performed by: FAMILY MEDICINE

## 2018-08-07 PROBLEM — R79.89 ABNORMAL TSH: Status: ACTIVE | Noted: 2018-08-07

## 2018-08-07 LAB
THYROGLOB AB SERPL IA-ACNC: <20 IU/ML (ref 0–40)
THYROPEROXIDASE AB SERPL-ACNC: 14 IU/ML

## 2018-08-08 ENCOUNTER — MYC MEDICAL ADVICE (OUTPATIENT)
Dept: FAMILY MEDICINE | Facility: CLINIC | Age: 58
End: 2018-08-08

## 2018-08-13 ENCOUNTER — ALLIED HEALTH/NURSE VISIT (OUTPATIENT)
Dept: NURSING | Facility: CLINIC | Age: 58
End: 2018-08-13
Payer: COMMERCIAL

## 2018-08-13 DIAGNOSIS — Z23 NEED FOR PNEUMOCOCCAL VACCINE: ICD-10-CM

## 2018-08-13 DIAGNOSIS — Z23 NEED FOR SHINGLES VACCINE: ICD-10-CM

## 2018-08-13 PROCEDURE — 99207 ZZC NO CHARGE NURSE ONLY: CPT

## 2018-08-13 PROCEDURE — 90471 IMMUNIZATION ADMIN: CPT

## 2018-08-13 PROCEDURE — 90472 IMMUNIZATION ADMIN EACH ADD: CPT

## 2018-08-13 PROCEDURE — 90750 HZV VACC RECOMBINANT IM: CPT

## 2018-08-13 PROCEDURE — 90670 PCV13 VACCINE IM: CPT

## 2018-08-13 NOTE — MR AVS SNAPSHOT
After Visit Summary   8/13/2018    Ryan Cruz    MRN: [id number]           Patient Information     Date Of Birth          1960        Visit Information        Provider Department      8/13/2018 10:00 AM BA ANCILLARY [facility]        Today's Diagnoses     Need for pneumococcal vaccine        Need for shingles vaccine           Follow-ups after your visit        Your next 10 appointments already scheduled     Aug 13, 2018 10:00 AM CDT   Nurse Only with BA ANCILLARY   [facility] ([facility]    [address] [address] [phone]              Who to contact     If you have questions or need follow up information about today's clinic visit or your schedule please contact [facility] directly at [phone].  Normal or non-critical lab and imaging results will be communicated to you by MyChart, letter or phone within 4 business days after the clinic has received the results. If you do not hear from us within 7 days, please contact the clinic through [employer] or phone. If you have a critical or abnormal lab result, we will notify you by phone as soon as possible.  Submit refill requests through [employer] or call your pharmacy and they will forward the refill request to us. Please allow 3 business days for your refill to be completed.          Additional Information About Your Visit        MyChart Information     [employer] gives you secure access to your electronic health record. If you see a primary care provider, you can also send messages to your care team and make appointments. If you have questions, please call your primary care clinic.  If you do not have a primary care provider, please call [phone] and they will assist you.        Care EveryWhere ID     This is your Care EveryWhere ID. This could be used by other organizations to access your [place] medical records  [id number]        Your Vitals  Were     Last Period                   04/17/2014 (Exact Date)            Blood Pressure from Last 3 Encounters:   07/09/18 98/62   05/23/18 94/64   06/22/17 111/67    Weight from Last 3 Encounters:   07/09/18 140 lb (63.5 kg)   05/23/18 137 lb (62.1 kg)   06/22/17 156 lb 11.2 oz (71.1 kg)              We Performed the Following     HC ZOSTER VACCINE RECOMBINANT ADJUVANTED IM NJX     PNEUMOCOCCAL CONJ VACCINE 13 VALENT IM        Primary Care Provider Office Phone # Fax #    Jaelyn Garcia [phone] [address]  [facility] [address]        Equal Access to Services     LCAIRE FARLEY : Mikal Maya, wajose blackburn, qaybta kaalmada gagan, hoa buckner . So [facility] [phone].    ATENCIÓN: Si habla español, tiene a sommers disposición servicios gratuitos de asistencia lingüística. Llame al [phone].    We comply with applicable federal civil rights laws and [place] laws. We do not discriminate on the basis of race, color, national origin, age, disability, sex, sexual orientation, or gender identity.            Thank you!     Thank you for choosing [facility]  for your care. Our goal is always to provide you with excellent care. Hearing back from our patients is one way we can continue to improve our services. Please take a few minutes to complete the written survey that you may receive in the mail after your visit with us. Thank you!             Your Updated Medication List - Protect others around you: Learn how to safely use, store and throw away your medicines at www.disposemymeds.org.          This list is accurate as of 8/13/18  9:58 AM.  Always use your most recent med list.                   Brand Name Dispense Instructions for use Diagnosis    balsalazide 750 MG capsule    COLAZAL     Take 2,250 mg by mouth 3 times daily.        CALCIUM 600 + D PO      Take 1 tablet by mouth daily.        CANASA 1000 MG  Suppository   Generic drug:  mesalamine      Place 1 suppository rectally At Bedtime.        co-enzyme Q-10 100 MG Caps capsule      Take 1 capsule by mouth daily.        doxycycline 100 MG capsule    VIBRAMYCIN    20 capsule    Take 1 capsule (100 mg) by mouth 2 times daily    Acute maxillary sinusitis, recurrence not specified       ENTYVIO 60 MG/ML injection   Generic drug:  vedolizumab           FOLIC ACID PO      Take 2 mg by mouth daily. Takes 2 x 1 mg tabs        GLUTAMINE PO      Take 2,250 mg by mouth daily.        hydroxychloroquine 200 MG tablet    PLAQUENIL     Take 200 mg by mouth 2 times daily.        hyoscyamine 0.125 MG sublingual tablet    LEVSIN/SL          leucovorin 5 MG tablet    WELLCOVORIN     Take 5 mg by mouth daily.        LYSINE      Take  by mouth every morning. Unknown strength        METHOTREXATE PO      Take 17.5 mg by mouth once a week. Takes on Sundays 2.5 mg x 7 tabs= 17.5 mg        PEPPERMINT OIL      Take 1 capsule by mouth daily.        polyethylene glycol powder    MIRALAX/GLYCOLAX          PROBIOTIC PRODUCT PO      Take 1 capsule by mouth daily.        pseudoePHEDrine 120 MG 12 hr tablet    SUDAFED     Take 120 mg by mouth every 12 hours.        rizatriptan 10 MG ODT tab    MAXALT-MLT    6 tablet    Take 1 tablet with onset of migraine, may repeat once after 2 hrs. Do not exceed 3 tabs in 24 hrs.    Migraine without aura and without status migrainosus, not intractable       SM VITAMIN B12 TR 2000 MCG Tbcr   Generic drug:  Cyanocobalamin      Take 1 tablet by mouth daily.        * UNABLE TO FIND      MEDICATION NAME: Estrofactor - take 2 tablets by mouth twice daily        * UNABLE TO FIND      MEDICATION NAME: Zinc liquid - 1 tsp daily        VITAMIN D3 PO      Take 5,000 Units by mouth daily.        ZYRTEC 10 MG tablet   Generic drug:  cetirizine      Take 10 mg by mouth daily. One a day        * Notice:  This list has 2 medication(s) that are the same as other medications  prescribed for you. Read the directions carefully, and ask your doctor or other care provider to review them with you.

## 2018-08-13 NOTE — NURSING NOTE
Screening Questionnaire for Adult Immunization    Are you sick today?   No   Do you have allergies to medications, food, a vaccine component or latex?   Yes   Have you ever had a serious reaction after receiving a vaccination?   No   Do you have a long-term health problem with heart disease, lung disease, asthma, kidney disease, metabolic disease (e.g. diabetes), anemia, or other blood disorder?   No   Do you have cancer, leukemia, HIV/AIDS, or any other immune system problem?   No   In the past 3 months, have you taken medications that affect  your immune system, such as prednisone, other steroids, or anticancer drugs; drugs for the treatment of rheumatoid arthritis, Crohn s disease, or psoriasis; or have you had radiation treatments?   No   Have you had a seizure, or a brain or other nervous system problem?   No   During the past year, have you received a transfusion of blood or blood     products, or been given immune (gamma) globulin or antiviral drug?   No   For women: Are you pregnant or is there a chance you could become        pregnant during the next month?   No   Have you received any vaccinations in the past 4 weeks?   No     Immunization questionnaire was positive for at least one answer.    Patient instructed to remain in clinic for 15 minutes afterwards, and to report any adverse reaction to me immediately.       Screening performed by Bela Escudero on 8/13/2018 at 9:58 AM.

## 2018-09-06 ENCOUNTER — APPOINTMENT (OUTPATIENT)
Dept: LAB | Facility: CLINIC | Age: 58
End: 2018-09-06
Attending: PHYSICIAN ASSISTANT
Payer: COMMERCIAL

## 2018-09-12 ENCOUNTER — TRANSFERRED RECORDS (OUTPATIENT)
Dept: HEALTH INFORMATION MANAGEMENT | Facility: CLINIC | Age: 58
End: 2018-09-12

## 2018-09-17 ENCOUNTER — HOME INFUSION (PRE-WILLOW HOME INFUSION) (OUTPATIENT)
Dept: PHARMACY | Facility: CLINIC | Age: 58
End: 2018-09-17

## 2018-09-18 ENCOUNTER — HOME INFUSION (PRE-WILLOW HOME INFUSION) (OUTPATIENT)
Dept: PHARMACY | Facility: CLINIC | Age: 58
End: 2018-09-18

## 2018-09-19 NOTE — PROGRESS NOTES
This is a recent snapshot of the patient's [facility] medical record.  For current drug dose and complete information and questions, call [phone] or In Basket pool, fv home infusion ([id number])  CSN Number:  [id number]

## 2018-09-21 NOTE — PROGRESS NOTES
This is a recent snapshot of the patient's [facility] medical record.  For current drug dose and complete information and questions, call [phone] or In Basket pool, fv home infusion ([id number])  CSN Number:  [id number]

## 2018-09-24 ENCOUNTER — TRANSFERRED RECORDS (OUTPATIENT)
Dept: HEALTH INFORMATION MANAGEMENT | Facility: CLINIC | Age: 58
End: 2018-09-24

## 2018-10-08 ENCOUNTER — ALLIED HEALTH/NURSE VISIT (OUTPATIENT)
Dept: NURSING | Facility: CLINIC | Age: 58
End: 2018-10-08
Payer: COMMERCIAL

## 2018-10-08 DIAGNOSIS — Z23 NEED FOR PROPHYLACTIC VACCINATION AND INOCULATION AGAINST INFLUENZA: Primary | ICD-10-CM

## 2018-10-08 PROCEDURE — 90471 IMMUNIZATION ADMIN: CPT

## 2018-10-08 PROCEDURE — 99207 ZZC NO CHARGE NURSE ONLY: CPT

## 2018-10-08 PROCEDURE — 90682 RIV4 VACC RECOMBINANT DNA IM: CPT

## 2018-10-08 NOTE — PROGRESS NOTES

## 2018-10-08 NOTE — MR AVS SNAPSHOT
After Visit Summary   10/8/2018    Rayn Cruz    MRN: [id number]           Patient Information     Date Of Birth          1960        Visit Information        Provider Department      10/8/2018 9:00 AM BA ANCILLARY [facility]        Today's Diagnoses     Need for prophylactic vaccination and inoculation against influenza    -  1       Follow-ups after your visit        Who to contact     If you have questions or need follow up information about today's clinic visit or your schedule please contact [facility] directly at [phone].  Normal or non-critical lab and imaging results will be communicated to you by PubNubhart, letter or phone within 4 business days after the clinic has received the results. If you do not hear from us within 7 days, please contact the clinic through PubNubhart or phone. If you have a critical or abnormal lab result, we will notify you by phone as soon as possible.  Submit refill requests through Intelligent Apps (mytaxi) or call your pharmacy and they will forward the refill request to us. Please allow 3 business days for your refill to be completed.          Additional Information About Your Visit        MyChart Information     Intelligent Apps (mytaxi) gives you secure access to your electronic health record. If you see a primary care provider, you can also send messages to your care team and make appointments. If you have questions, please call your primary care clinic.  If you do not have a primary care provider, please call [phone] and they will assist you.        Care EveryWhere ID     This is your Care EveryWhere ID. This could be used by other organizations to access your [place] medical records  [id number]        Your Vitals Were     Last Period                   04/17/2014 (Exact Date)            Blood Pressure from Last 3 Encounters:   07/09/18 98/62   05/23/18 94/64   06/22/17 111/67    Weight from Last 3 Encounters:   07/09/18 63.5 kg (140 lb)   05/23/18  62.1 kg (137 lb)   06/22/17 71.1 kg (156 lb 11.2 oz)              We Performed the Following     FLU VACCINE, (RIV4) RECOMBINANT HA  , IM (FluBlok, egg free) [55922]- >18 YRS ([facility] recommended  50-64 YRS)     Vaccine Administration, Initial [97463]        Primary Care Provider Office Phone # Fax #    Jaelyn Garcia [phone] [address]  [place] [address]        Equal Access to Services     [facility]: Hadii aad ku hadasho Soomaali, waaxda luqadaha, qaybta kaalmada adeegyashaun, waxfatmata buckner . So [facility] [place] [facility] [phone].    ATENCIÓN: Si habla español, tiene a sommers disposición servicios gratuitos de asistencia lingüística. [facility] [phone].    We comply with applicable federal civil rights laws and [place] laws. We do not discriminate on the basis of race, color, national origin, age, disability, sex, sexual orientation, or gender identity.            Thank you!     Thank you for choosing [facility]  for your care. Our goal is always to provide you with excellent care. Hearing back from our patients is one way we can continue to improve our services. Please take a few minutes to complete the written survey that you may receive in the mail after your visit with us. Thank you!             Your Updated Medication List - Protect others around you: Learn how to safely use, store and throw away your medicines at www.disposemymeds.org.          This list is accurate as of 10/8/18  9:01 AM.  Always use your most recent med list.                   Brand Name Dispense Instructions for use Diagnosis    balsalazide 750 MG capsule    COLAZAL     Take 2,250 mg by mouth 3 times daily.        CALCIUM 600 + D PO      Take 1 tablet by mouth daily.        CANASA 1000 MG Suppository   Generic drug:  mesalamine      Place 1 suppository rectally At Bedtime.        co-enzyme Q-10 100 MG Caps capsule      Take 1 capsule by mouth daily.         doxycycline 100 MG capsule    VIBRAMYCIN    20 capsule    Take 1 capsule (100 mg) by mouth 2 times daily    Acute maxillary sinusitis, recurrence not specified       ENTYVIO 60 MG/ML injection   Generic drug:  vedolizumab           FOLIC ACID PO      Take 2 mg by mouth daily. Takes 2 x 1 mg tabs        GLUTAMINE PO      Take 2,250 mg by mouth daily.        hydroxychloroquine 200 MG tablet    PLAQUENIL     Take 200 mg by mouth 2 times daily.        hyoscyamine 0.125 MG sublingual tablet    LEVSIN/SL          leucovorin 5 MG tablet    WELLCOVORIN     Take 5 mg by mouth daily.        LYSINE      Take  by mouth every morning. Unknown strength        METHOTREXATE PO      Take 17.5 mg by mouth once a week. Takes on Sundays 2.5 mg x 7 tabs= 17.5 mg        PEPPERMINT OIL      Take 1 capsule by mouth daily.        polyethylene glycol powder    MIRALAX/GLYCOLAX          PROBIOTIC PRODUCT PO      Take 1 capsule by mouth daily.        pseudoePHEDrine 120 MG 12 hr tablet    SUDAFED     Take 120 mg by mouth every 12 hours.        rizatriptan 10 MG ODT tab    MAXALT-MLT    6 tablet    Take 1 tablet with onset of migraine, may repeat once after 2 hrs. Do not exceed 3 tabs in 24 hrs.    Migraine without aura and without status migrainosus, not intractable       SM VITAMIN B12 TR 2000 MCG Tbcr   Generic drug:  Cyanocobalamin      Take 1 tablet by mouth daily.        * UNABLE TO FIND      MEDICATION NAME: Estrofactor - take 2 tablets by mouth twice daily        * UNABLE TO FIND      MEDICATION NAME: Zinc liquid - 1 tsp daily        VITAMIN D3 PO      Take 5,000 Units by mouth daily.        ZYRTEC 10 MG tablet   Generic drug:  cetirizine      Take 10 mg by mouth daily. One a day        * Notice:  This list has 2 medication(s) that are the same as other medications prescribed for you. Read the directions carefully, and ask your doctor or other care provider to review them with you.

## 2018-10-10 ENCOUNTER — TRANSFERRED RECORDS (OUTPATIENT)
Dept: HEALTH INFORMATION MANAGEMENT | Facility: CLINIC | Age: 58
End: 2018-10-10

## 2019-05-13 ENCOUNTER — OFFICE VISIT (OUTPATIENT)
Dept: FAMILY MEDICINE | Facility: CLINIC | Age: 59
End: 2019-05-13
Payer: COMMERCIAL

## 2019-05-13 VITALS
DIASTOLIC BLOOD PRESSURE: 63 MMHG | SYSTOLIC BLOOD PRESSURE: 108 MMHG | BODY MASS INDEX: 27.23 KG/M2 | RESPIRATION RATE: 18 BRPM | WEIGHT: 148 LBS | TEMPERATURE: 97.6 F | HEIGHT: 62 IN | OXYGEN SATURATION: 98 % | HEART RATE: 92 BPM

## 2019-05-13 DIAGNOSIS — M19.90 INFLAMMATORY ARTHRITIS: ICD-10-CM

## 2019-05-13 DIAGNOSIS — G43.009 MIGRAINE WITHOUT AURA AND WITHOUT STATUS MIGRAINOSUS, NOT INTRACTABLE: ICD-10-CM

## 2019-05-13 DIAGNOSIS — Z11.4 SCREENING FOR HIV (HUMAN IMMUNODEFICIENCY VIRUS): ICD-10-CM

## 2019-05-13 DIAGNOSIS — R79.89 ABNORMAL TSH: ICD-10-CM

## 2019-05-13 DIAGNOSIS — Z00.00 ENCOUNTER FOR ROUTINE ADULT HEALTH EXAMINATION WITHOUT ABNORMAL FINDINGS: Primary | ICD-10-CM

## 2019-05-13 DIAGNOSIS — N95.2 ATROPHIC VAGINITIS: ICD-10-CM

## 2019-05-13 DIAGNOSIS — K50.90 CROHN'S DISEASE WITHOUT COMPLICATION, UNSPECIFIED GASTROINTESTINAL TRACT LOCATION (H): ICD-10-CM

## 2019-05-13 LAB
ALBUMIN SERPL-MCNC: 3.9 G/DL (ref 3.4–5)
ALP SERPL-CCNC: 108 U/L (ref 40–150)
ALT SERPL W P-5'-P-CCNC: 26 U/L (ref 0–50)
ANION GAP SERPL CALCULATED.3IONS-SCNC: 9 MMOL/L (ref 3–14)
AST SERPL W P-5'-P-CCNC: 28 U/L (ref 0–45)
BASOPHILS # BLD AUTO: 0 10E9/L (ref 0–0.2)
BASOPHILS NFR BLD AUTO: 0.5 %
BILIRUB SERPL-MCNC: 0.2 MG/DL (ref 0.2–1.3)
BUN SERPL-MCNC: 6 MG/DL (ref 7–30)
CALCIUM SERPL-MCNC: 9.5 MG/DL (ref 8.5–10.1)
CHLORIDE SERPL-SCNC: 102 MMOL/L (ref 94–109)
CHOLEST SERPL-MCNC: 187 MG/DL
CO2 SERPL-SCNC: 25 MMOL/L (ref 20–32)
CREAT SERPL-MCNC: 0.57 MG/DL (ref 0.52–1.04)
DIFFERENTIAL METHOD BLD: NORMAL
EOSINOPHIL # BLD AUTO: 0.4 10E9/L (ref 0–0.7)
EOSINOPHIL NFR BLD AUTO: 4.2 %
ERYTHROCYTE [DISTWIDTH] IN BLOOD BY AUTOMATED COUNT: 13.1 % (ref 10–15)
GFR SERPL CREATININE-BSD FRML MDRD: >90 ML/MIN/{1.73_M2}
GLUCOSE SERPL-MCNC: 90 MG/DL (ref 70–99)
HCT VFR BLD AUTO: 39.3 % (ref 35–47)
HDLC SERPL-MCNC: 91 MG/DL
HGB BLD-MCNC: 13.6 G/DL (ref 11.7–15.7)
LDLC SERPL CALC-MCNC: 86 MG/DL
LYMPHOCYTES # BLD AUTO: 2.2 10E9/L (ref 0.8–5.3)
LYMPHOCYTES NFR BLD AUTO: 25.4 %
MCH RBC QN AUTO: 31.2 PG (ref 26.5–33)
MCHC RBC AUTO-ENTMCNC: 34.6 G/DL (ref 31.5–36.5)
MCV RBC AUTO: 90 FL (ref 78–100)
MONOCYTES # BLD AUTO: 0.8 10E9/L (ref 0–1.3)
MONOCYTES NFR BLD AUTO: 9 %
NEUTROPHILS # BLD AUTO: 5.3 10E9/L (ref 1.6–8.3)
NEUTROPHILS NFR BLD AUTO: 60.9 %
NONHDLC SERPL-MCNC: 96 MG/DL
PLATELET # BLD AUTO: 316 10E9/L (ref 150–450)
POTASSIUM SERPL-SCNC: 4.1 MMOL/L (ref 3.4–5.3)
PROT SERPL-MCNC: 7.6 G/DL (ref 6.8–8.8)
RBC # BLD AUTO: 4.36 10E12/L (ref 3.8–5.2)
SODIUM SERPL-SCNC: 136 MMOL/L (ref 133–144)
TRIGL SERPL-MCNC: 50 MG/DL
TSH SERPL DL<=0.005 MIU/L-ACNC: 3.64 MU/L (ref 0.4–4)
WBC # BLD AUTO: 8.6 10E9/L (ref 4–11)

## 2019-05-13 PROCEDURE — 85025 COMPLETE CBC W/AUTO DIFF WBC: CPT | Performed by: FAMILY MEDICINE

## 2019-05-13 PROCEDURE — 87389 HIV-1 AG W/HIV-1&-2 AB AG IA: CPT | Performed by: FAMILY MEDICINE

## 2019-05-13 PROCEDURE — 84443 ASSAY THYROID STIM HORMONE: CPT | Performed by: FAMILY MEDICINE

## 2019-05-13 PROCEDURE — 36415 COLL VENOUS BLD VENIPUNCTURE: CPT | Performed by: FAMILY MEDICINE

## 2019-05-13 PROCEDURE — 80053 COMPREHEN METABOLIC PANEL: CPT | Performed by: FAMILY MEDICINE

## 2019-05-13 PROCEDURE — 80061 LIPID PANEL: CPT | Performed by: FAMILY MEDICINE

## 2019-05-13 PROCEDURE — 86800 THYROGLOBULIN ANTIBODY: CPT | Performed by: FAMILY MEDICINE

## 2019-05-13 PROCEDURE — 86376 MICROSOMAL ANTIBODY EACH: CPT | Performed by: FAMILY MEDICINE

## 2019-05-13 PROCEDURE — 99396 PREV VISIT EST AGE 40-64: CPT | Performed by: FAMILY MEDICINE

## 2019-05-13 ASSESSMENT — MIFFLIN-ST. JEOR: SCORE: 1191.63

## 2019-05-13 ASSESSMENT — PAIN SCALES - GENERAL: PAINLEVEL: EXTREME PAIN (8)

## 2019-05-13 NOTE — PROGRESS NOTES
SUBJECTIVE:   CC: Ryan Cruz is an 58 year old woman who presents for preventive health visit.     Healthy Habits:    Do you get at least three servings of calcium containing foods daily (dairy, green leafy vegetables, etc.)? no, taking calcium and/or vitamin D supplement: yes    Amount of exercise or daily activities, outside of work: 6 day(s) per week    Problems taking medications regularly No    Medication side effects: No    Have you had an eye exam in the past two years? yes    Do you see a dentist twice per year? yes    Do you have sleep apnea, excessive snoring or daytime drowsiness?no    Dental pain:   -Patient had an abscessed tooth extracted on the right 3 days ago while in [place]. The infection was from a cracked crown. She is seeing her regular dentist later today  -Planning on using a suppository later for rectal bleeding due to Crohn's flare. Her bowels have been abnormal due to the antibiotics for the infection and not being able to eat normally because of her tooth. Has appointments scheduled with GI (tomorrow) and rheumatology this week. No fever, chills, significant abd pain.     Weight:  -Patient's goal weight is 145    Wt Readings from Last 4 Encounters:   05/13/19 67.1 kg (148 lb)   07/09/18 63.5 kg (140 lb)   05/23/18 62.1 kg (137 lb)   06/22/17 71.1 kg (156 lb 11.2 oz)     Additional:  -Patient has not had many migraines recently   -Complains of vaginal dryness and pain with intercourse. She has tried Replens but did not find it helpful   -Occasionally gets sores on her scalp, has also had one on her labia recently that has resolved    Today's PHQ-2 Score:   PHQ-2 ( 1999 [employer]) 5/13/2019 5/23/2018   Q1: Little interest or pleasure in doing things 0 0   Q2: Feeling down, depressed or hopeless 0 0   PHQ-2 Score 0 0   Q1: Little interest or pleasure in doing things - -   Q2: Feeling down, depressed or hopeless - -   PHQ-2 Score - -       Abuse: Current or Past(Physical, Sexual or  Emotional)- No  Do you feel safe in your environment? Yes    Social History     Tobacco Use     Smoking status: Never Smoker     Smokeless tobacco: Never Used   Substance Use Topics     Alcohol use: No     If you drink alcohol do you typically have >3 drinks per day or >7 drinks per week? No                     Reviewed orders with patient.  Reviewed health maintenance and updated orders accordingly - Yes  Patient Active Problem List   Diagnosis     Migraine without aura and without status migrainosus, not intractable     Allergic rhinitis     Inflammatory arthritis     Crohns disease (H)     CARDIOVASCULAR SCREENING; LDL GOAL LESS THAN 160     Health Care Home     Inflammatory bowel disease     Pulmonary nodule     Encounter for dietary counseling and surveillance     Left sided ulcerative (chronic) colitis (H)     Overweight (BMI 25.0-29.9)     Advance care planning     Abnormal TSH     Past Surgical History:   Procedure Laterality Date     CARPAL TUNNEL RELEASE RT/LT       HYSTERECTOMY, SUPRACERVICAL LAPAROSCOPIC         Social History     Tobacco Use     Smoking status: Never Smoker     Smokeless tobacco: Never Used   Substance Use Topics     Alcohol use: No     Family History   Problem Relation Age of Onset     Arthritis Mother         rheumatoid arthritis     Cancer Mother         stomach     Gastrointestinal Disease Father          at 68 from Crohn's     Diabetes Father      Arthritis Sister         Rheumatoid arthritis     Cerebrovascular Disease Maternal Grandfather      Cerebrovascular Disease Paternal Grandfather      Breast Cancer Cousin      Breast Cancer Cousin            Mammogram Screening: Patient over age 50, mutual decision to screen reflected in health maintenance.    Pertinent mammograms are reviewed under the imaging tab.  History of abnormal Pap smear: NO - age 30-65 PAP every 5 years with negative HPV co-testing recommended  PAP / HPV Latest Ref Rng & Units 2016  "10/12/2011   PAP - NIL NIL ASC-US(A)   HPV 16 DNA NEG Negative - -   HPV 18 DNA NEG Negative - -   OTHER HR HPV NEG Negative - -     Reviewed and updated as needed this visit by clinical staff  Tobacco  Allergies  Meds  Med Hx  Surg Hx  Fam Hx  Soc Hx        Reviewed and updated as needed this visit by Provider            ROS:   ROS: 10 point ROS neg other than the symptoms noted above in the HPI.    This document serves as a record of the services and decisions personally performed by ALENA VIRK. It was created on his/her behalf by Michelle Arrieta, a trained medical scribe. The creation of this document is based on the provider's statements to the medical scribe. Michelle Arrieta, May 13, 2019 11:05 AM  OBJECTIVE:   /63 (BP Location: Right arm, Patient Position: Chair, Cuff Size: Adult Large)   Pulse 92   Temp 97.6  F (36.4  C) (Oral)   Resp 18   Ht 1.562 m (5' 1.5\")   Wt 67.1 kg (148 lb)   LMP 04/17/2014 (Exact Date)   SpO2 98%   Breastfeeding? No   BMI 27.51 kg/m    EXAM:  GENERAL APPEARANCE: healthy, alert and no distress  EYES: Eyes grossly normal to inspection, PERRL and conjunctivae and sclerae normal  HENT: ear canals and TM's normal, nose and mouth without ulcers or lesions, oropharynx clear and oral mucous membranes moist  NECK: no adenopathy, no asymmetry, masses, or scars and thyroid normal to palpation  RESP: lungs clear to auscultation - no rales, rhonchi or wheezes  BREAST: normal without masses, tenderness or nipple discharge and no palpable axillary masses or adenopathy  CV: regular rate and rhythm, normal S1 S2, no S3 or S4, no murmur, click or rub, no peripheral edema and peripheral pulses strong  ABDOMEN: soft, nontender, no hepatosplenomegaly, no masses and bowel sounds normal   (female): normal female external genitalia, normal urethral meatus, vaginal mucosal atrophy noted, normal cervix, adnexae. S/p hysterectomy  MS: no musculoskeletal defects are noted and " "gait is age appropriate without ataxia  SKIN: no suspicious lesions or rashes  NEURO: Normal strength and tone, sensory exam grossly normal, mentation intact and speech normal  PSYCH: mentation appears normal and affect normal/bright  ASSESSMENT/PLAN:   1. Encounter for routine adult health examination without abnormal findings  - Lipid panel reflex to direct LDL Fasting    2. Screening for HIV (human immunodeficiency virus)  - HIV Screening    3. Atrophic vaginitis  -estrace vaginal cream discussed and patient would like to trial. Reviewed pros/cons/risks.     4. Migraine without aura and without status migrainosus, not intractable  Improving with menopause. No need for tryptan refill today per patient.     5. Crohn's disease without complication, unspecified gastrointestinal tract location (H)  Recent flare, will be starting suppositories and will see GI tomorrow   - Comprehensive metabolic panel  - CBC with platelets differential    6. Inflammatory arthritis  Stable. Followed by rheumatology  - Comprehensive metabolic panel  - CBC with platelets differential    7. Hx of Abnormal TSH  - TSH with free T4 reflex  - Thyroid peroxidase antibody  - Anti thyroglobulin antibody    COUNSELING:   Reviewed preventive health counseling, as reflected in patient instructions  Special attention given to:        Regular exercise       Healthy diet/nutrition       Vision screening       Hearing screening       HIV screeninx in teen years, 1x in adult years, and at intervals if high risk    Estimated body mass index is 27.51 kg/m  as calculated from the following:    Height as of this encounter: 1.562 m (5' 1.5\").    Weight as of this encounter: 67.1 kg (148 lb).    Weight management plan: Discussed healthy diet and exercise guidelines     reports that she has never smoked. She has never used smokeless tobacco.    Patient Instructions   Please call [facility] (formerly called [facility] [place]) at [phone] to schedule your mammogram.     Patient Education       Preventive Health Recommendations  Female Ages 50 - 64    Yearly exam: See your health care provider every year in order to  o Review health changes.   o Discuss preventive care.    o Review your medicines if your doctor has prescribed any.      Get a Pap test every three years (unless you have an abnormal result and your provider advises testing more often).    If you get Pap tests with HPV test, you only need to test every 5 years, unless you have an abnormal result.     You do not need a Pap test if your uterus was removed (hysterectomy) and you have not had cancer.    You should be tested each year for STDs (sexually transmitted diseases) if you're at risk.     Have a mammogram every 1 to 2 years.    Have a colonoscopy at age 50, or have a yearly FIT test (stool test). These exams screen for colon cancer.      Have a cholesterol test every 5 years, or more often if advised.    Have a diabetes test (fasting glucose) every three years. If you are at risk for diabetes, you should have this test more often.     If you are at risk for osteoporosis (brittle bone disease), think about having a bone density scan (DEXA).    Shots: Get a flu shot each year. Get a tetanus shot every 10 years.    Nutrition:     Eat at least 5 servings of fruits and vegetables each day.    Eat whole-grain bread, whole-wheat pasta and brown rice instead of white grains and rice.    Get adequate Calcium and Vitamin D.     Lifestyle    Exercise at least 150 minutes a week (30 minutes a day, 5 days a week). This will help you control your weight and prevent disease.    Limit alcohol to one drink per day.    No smoking.     Wear sunscreen to prevent skin cancer.     See your dentist every six months for an exam and cleaning.    See your eye doctor every 1 to 2 years.        Patient Instructions   Please call [facility] (formerly called  [facility]) at [phone] to schedule your mammogram.     Patient Education       Preventive Health Recommendations  Female Ages 50 - 64    Yearly exam: See your health care provider every year in order to  o Review health changes.   o Discuss preventive care.    o Review your medicines if your doctor has prescribed any.      Get a Pap test every three years (unless you have an abnormal result and your provider advises testing more often).    If you get Pap tests with HPV test, you only need to test every 5 years, unless you have an abnormal result.     You do not need a Pap test if your uterus was removed (hysterectomy) and you have not had cancer.    You should be tested each year for STDs (sexually transmitted diseases) if you're at risk.     Have a mammogram every 1 to 2 years.    Have a colonoscopy at age 50, or have a yearly FIT test (stool test). These exams screen for colon cancer.      Have a cholesterol test every 5 years, or more often if advised.    Have a diabetes test (fasting glucose) every three years. If you are at risk for diabetes, you should have this test more often.     If you are at risk for osteoporosis (brittle bone disease), think about having a bone density scan (DEXA).    Shots: Get a flu shot each year. Get a tetanus shot every 10 years.    Nutrition:     Eat at least 5 servings of fruits and vegetables each day.    Eat whole-grain bread, whole-wheat pasta and brown rice instead of white grains and rice.    Get adequate Calcium and Vitamin D.     Lifestyle    Exercise at least 150 minutes a week (30 minutes a day, 5 days a week). This will help you control your weight and prevent disease.    Limit alcohol to one drink per day.    No smoking.     Wear sunscreen to prevent skin cancer.     See your dentist every six months for an exam and cleaning.    See your eye doctor every 1 to 2 years.        Counseling Resources:  ATP IV Guidelines  Pooled Cohorts Equation  Calculator  Breast Cancer Risk Calculator  FRAX Risk Assessment  ICSI Preventive Guidelines  Dietary Guidelines for Americans, 2010  USDA's MyPlate  ASA Prophylaxis  Lung CA Screening    The information in this document, created by the medical scribe for me, accurately reflects the services I personally performed and the decisions made by me. I have reviewed and approved this document for accuracy.   Jaelyn Garcia MD  [facility]

## 2019-05-13 NOTE — PATIENT INSTRUCTIONS
Please call [facility] (formerly called [facility]) at [phone] to schedule your mammogram.     Patient Education       Preventive Health Recommendations  Female Ages 50 - 64    Yearly exam: See your health care provider every year in order to  o Review health changes.   o Discuss preventive care.    o Review your medicines if your doctor has prescribed any.      Get a Pap test every three years (unless you have an abnormal result and your provider advises testing more often).    If you get Pap tests with HPV test, you only need to test every 5 years, unless you have an abnormal result.     You do not need a Pap test if your uterus was removed (hysterectomy) and you have not had cancer.    You should be tested each year for STDs (sexually transmitted diseases) if you're at risk.     Have a mammogram every 1 to 2 years.    Have a colonoscopy at age 50, or have a yearly FIT test (stool test). These exams screen for colon cancer.      Have a cholesterol test every 5 years, or more often if advised.    Have a diabetes test (fasting glucose) every three years. If you are at risk for diabetes, you should have this test more often.     If you are at risk for osteoporosis (brittle bone disease), think about having a bone density scan (DEXA).    Shots: Get a flu shot each year. Get a tetanus shot every 10 years.    Nutrition:     Eat at least 5 servings of fruits and vegetables each day.    Eat whole-grain bread, whole-wheat pasta and brown rice instead of white grains and rice.    Get adequate Calcium and Vitamin D.     Lifestyle    Exercise at least 150 minutes a week (30 minutes a day, 5 days a week). This will help you control your weight and prevent disease.    Limit alcohol to one drink per day.    No smoking.     Wear sunscreen to prevent skin cancer.     See your dentist every six months for an exam and cleaning.    See your eye doctor every 1 to 2 years.

## 2019-05-14 ENCOUNTER — TRANSFERRED RECORDS (OUTPATIENT)
Dept: HEALTH INFORMATION MANAGEMENT | Facility: CLINIC | Age: 59
End: 2019-05-14

## 2019-05-14 LAB
HIV 1+2 AB+HIV1 P24 AG SERPL QL IA: NONREACTIVE
THYROGLOB AB SERPL IA-ACNC: <20 IU/ML (ref 0–40)
THYROPEROXIDASE AB SERPL-ACNC: 17 IU/ML

## 2019-05-14 RX ORDER — ESTRADIOL 0.1 MG/G
CREAM VAGINAL
Qty: 42.5 G | Refills: 0 | Status: SHIPPED | OUTPATIENT
Start: 2019-05-14 | End: 2020-07-08

## 2019-05-31 ENCOUNTER — HOME INFUSION (PRE-WILLOW HOME INFUSION) (OUTPATIENT)
Dept: PHARMACY | Facility: CLINIC | Age: 59
End: 2019-05-31

## 2019-06-01 ENCOUNTER — HOME INFUSION (PRE-WILLOW HOME INFUSION) (OUTPATIENT)
Dept: PHARMACY | Facility: CLINIC | Age: 59
End: 2019-06-01

## 2019-06-03 ENCOUNTER — HOME INFUSION (PRE-WILLOW HOME INFUSION) (OUTPATIENT)
Dept: PHARMACY | Facility: CLINIC | Age: 59
End: 2019-06-03

## 2019-06-04 NOTE — PROGRESS NOTES
This is a recent snapshot of the patient's [facility] medical record.  For current drug dose and complete information and questions, call [phone] or In [facility] pool, fv home infusion ([id number])  CSN Number:  [id number]

## 2019-06-05 NOTE — PROGRESS NOTES
Skilled Nurse visit in the home  to administer Entyvio 300mg. Current weight 146 pounds. PIV placed right f/a, first attempt.  Infusion completed without complication or reaction. Pt reports no adverse symptoms related to therapy.    Mayela Jenkins RN, CRNI  [phone]   [email or web address]

## 2019-06-05 NOTE — PROGRESS NOTES
This is a recent snapshot of the patient's [facility] medical record.  For current drug dose and complete information and questions, call [phone] or In [facility] pool, fv home infusion ([id number])  CSN Number:  [id number]

## 2019-06-06 ENCOUNTER — APPOINTMENT (OUTPATIENT)
Dept: LAB | Facility: CLINIC | Age: 59
End: 2019-06-06
Attending: PHYSICIAN ASSISTANT
Payer: COMMERCIAL

## 2019-06-06 ENCOUNTER — TRANSFERRED RECORDS (OUTPATIENT)
Dept: HEALTH INFORMATION MANAGEMENT | Facility: CLINIC | Age: 59
End: 2019-06-06

## 2019-06-06 NOTE — PROGRESS NOTES
This is a recent snapshot of the patient's [facility] medical record.  For current drug dose and complete information and questions, call [phone] or In [facility] pool, fv home infusion ([id number])  CSN Number:  [id number]

## 2019-07-15 ENCOUNTER — HOME INFUSION (PRE-WILLOW HOME INFUSION) (OUTPATIENT)
Dept: PHARMACY | Facility: CLINIC | Age: 59
End: 2019-07-15

## 2019-07-15 NOTE — PHARMACY
Skilled Nurse visit in the home to administer Entyvio 300 mg IV. Current weight 146 lbs PIV placed in right AC, one attempt. Infusion completed without complication or reaction. Pt reports no symptoms, related to therapy.    [email or web address]  [phone]

## 2019-07-16 NOTE — PROGRESS NOTES
This is a recent snapshot of the patient's [facility] medical record.  For current drug dose and complete information and questions, call [phone] or In [facility] pool, fv home infusion ([id number])  CSN Number:  [id number]

## 2019-08-08 ENCOUNTER — APPOINTMENT (OUTPATIENT)
Dept: LAB | Facility: CLINIC | Age: 59
End: 2019-08-08
Attending: PHYSICIAN ASSISTANT
Payer: COMMERCIAL

## 2019-08-27 ENCOUNTER — HOME INFUSION (PRE-WILLOW HOME INFUSION) (OUTPATIENT)
Dept: PHARMACY | Facility: CLINIC | Age: 59
End: 2019-08-27

## 2019-08-28 ENCOUNTER — TRANSFERRED RECORDS (OUTPATIENT)
Dept: HEALTH INFORMATION MANAGEMENT | Facility: CLINIC | Age: 59
End: 2019-08-28

## 2019-08-30 ENCOUNTER — APPOINTMENT (OUTPATIENT)
Dept: LAB | Facility: CLINIC | Age: 59
End: 2019-08-30
Attending: PHYSICIAN ASSISTANT
Payer: COMMERCIAL

## 2019-08-30 NOTE — PROGRESS NOTES
This is a recent snapshot of the patient's [facility] medical record.  For current drug dose and complete information and questions, call [phone] or In [facility] pool, fv home infusion ([id number])  CSN Number:  [id number]

## 2019-09-17 ENCOUNTER — ANCILLARY PROCEDURE (OUTPATIENT)
Dept: MAMMOGRAPHY | Facility: CLINIC | Age: 59
End: 2019-09-17
Attending: FAMILY MEDICINE
Payer: COMMERCIAL

## 2019-09-17 DIAGNOSIS — Z12.31 SCREENING MAMMOGRAM, ENCOUNTER FOR: ICD-10-CM

## 2019-09-17 PROCEDURE — 77067 SCR MAMMO BI INCL CAD: CPT

## 2019-09-17 PROCEDURE — 77063 BREAST TOMOSYNTHESIS BI: CPT

## 2019-09-26 ENCOUNTER — ALLIED HEALTH/NURSE VISIT (OUTPATIENT)
Dept: NURSING | Facility: CLINIC | Age: 59
End: 2019-09-26
Payer: COMMERCIAL

## 2019-09-26 DIAGNOSIS — Z23 NEED FOR PROPHYLACTIC VACCINATION AND INOCULATION AGAINST INFLUENZA: Primary | ICD-10-CM

## 2019-09-26 PROCEDURE — 90686 IIV4 VACC NO PRSV 0.5 ML IM: CPT

## 2019-09-26 PROCEDURE — 99207 ZZC NO CHARGE NURSE ONLY: CPT

## 2019-09-26 PROCEDURE — 90471 IMMUNIZATION ADMIN: CPT

## 2019-10-03 ENCOUNTER — APPOINTMENT (OUTPATIENT)
Dept: LAB | Facility: CLINIC | Age: 59
End: 2019-10-03
Attending: PHYSICIAN ASSISTANT
Payer: COMMERCIAL

## 2019-10-04 ENCOUNTER — HOME INFUSION (PRE-WILLOW HOME INFUSION) (OUTPATIENT)
Dept: PHARMACY | Facility: CLINIC | Age: 59
End: 2019-10-04

## 2019-10-07 NOTE — PROGRESS NOTES
This is a recent snapshot of the patient's [facility] medical record.  For current drug dose and complete information and questions, call [phone] or In [facility] pool, fv home infusion ([id number])  CSN Number:  [id number]

## 2019-10-08 ENCOUNTER — HOME INFUSION (PRE-WILLOW HOME INFUSION) (OUTPATIENT)
Dept: PHARMACY | Facility: CLINIC | Age: 59
End: 2019-10-08

## 2019-10-09 ENCOUNTER — TRANSFERRED RECORDS (OUTPATIENT)
Dept: HEALTH INFORMATION MANAGEMENT | Facility: CLINIC | Age: 59
End: 2019-10-09

## 2019-10-09 NOTE — PROGRESS NOTES
This is a recent snapshot of the patient's [facility] medical record.  For current drug dose and complete information and questions, call [phone] or In [facility] pool, fv home infusion ([id number])  CSN Number:  [id number]

## 2019-10-29 ENCOUNTER — HEALTH MAINTENANCE LETTER (OUTPATIENT)
Age: 59
End: 2019-10-29

## 2020-05-11 ENCOUNTER — TRANSFERRED RECORDS (OUTPATIENT)
Dept: HEALTH INFORMATION MANAGEMENT | Facility: CLINIC | Age: 60
End: 2020-05-11

## 2020-06-02 ENCOUNTER — VIRTUAL VISIT (OUTPATIENT)
Dept: FAMILY MEDICINE | Facility: CLINIC | Age: 60
End: 2020-06-02
Payer: COMMERCIAL

## 2020-06-02 DIAGNOSIS — M19.90 INFLAMMATORY ARTHRITIS: ICD-10-CM

## 2020-06-02 DIAGNOSIS — J01.90 ACUTE SINUSITIS WITH COEXISTING CONDITION REQUIRING PROPHYLACTIC TREATMENT: Primary | ICD-10-CM

## 2020-06-02 DIAGNOSIS — K50.90 CROHN'S DISEASE WITHOUT COMPLICATION, UNSPECIFIED GASTROINTESTINAL TRACT LOCATION (H): ICD-10-CM

## 2020-06-02 DIAGNOSIS — G43.009 MIGRAINE WITHOUT AURA AND WITHOUT STATUS MIGRAINOSUS, NOT INTRACTABLE: ICD-10-CM

## 2020-06-02 PROCEDURE — 99214 OFFICE O/P EST MOD 30 MIN: CPT | Mod: TEL | Performed by: FAMILY MEDICINE

## 2020-06-02 RX ORDER — DOXYCYCLINE HYCLATE 100 MG
100 TABLET ORAL 2 TIMES DAILY
Qty: 20 TABLET | Refills: 0 | Status: SHIPPED | OUTPATIENT
Start: 2020-06-02 | End: 2020-07-08

## 2020-06-02 RX ORDER — RIZATRIPTAN BENZOATE 10 MG/1
TABLET, ORALLY DISINTEGRATING ORAL
Qty: 6 TABLET | Refills: 3 | Status: SHIPPED | OUTPATIENT
Start: 2020-06-02 | End: 2022-05-12

## 2020-06-02 NOTE — PROGRESS NOTES
"Ryan Cruz is a 59 year old female who is being evaluated via a billable telephone visit.      The patient has been notified of following:     \"This telephone visit will be conducted via a call between you and your physician/provider. We have found that certain health care needs can be provided without the need for a physical exam.  This service lets us provide the care you need with a short phone conversation.  If a prescription is necessary we can send it directly to your pharmacy.  If lab work is needed we can place an order for that and you can then stop by our lab to have the test done at a later time.    Telephone visits are billed at different rates depending on your insurance coverage. During this emergency period, for some insurers they may be billed the same as an in-person visit.  Please reach out to your insurance provider with any questions.    If during the course of the call the physician/provider feels a telephone visit is not appropriate, you will not be charged for this service.\"    Patient has given verbal consent for Telephone visit?  Yes    What phone number would you like to be contacted at? [phone]    How would you like to obtain your AVS? MyChart    Subjective     Ryan Cruz is a 59 year old female who presents via phone visit today for the following health issues:    HPI  ENT Symptoms             Symptoms: cc Present Absent Comment   Fever/Chills   x    Fatigue  x  Ongoing problem    Muscle Aches   x    Eye Irritation  a  Vision changes of feeling \"foggy\", light sensitive, sound sensitive    Sneezing   x    Nasal Derrek/Drg  x     Sinus Pressure/Pain  x  Causing headaches    Loss of smell   x    Dental pain  x  Slight gum bleeding, no pain    Sore Throat   x    Swollen Glands   x    Ear Pain/Fullness  x  Right sided fullness, ear pain over the weekend, has improved some with mucinex      Cough   x    Wheeze   x    Chest Pain   x    Shortness of breath   x    Rash   x    Other  x  " Headache, nausea, Saturday puluse was 100-101, today pulse 75, dizziness     Symptom duration:  Started last week, worse since Friday    Symptom severity:  Severe- improving slightly    Treatments tried:  Maxalt for headache, mucinex    Contacts:  none - has been in quarantine due to infusion next week        thought symptoms were just a migraine when first starting. Used maxalt and got better but still had some pressure.   Then a few days ago over the weekend she noted when she bent over there was a lot of sinus pressure and pain and dizziness  Feels similar to past sinus infections.     Took mucinex yesterday and that was helpful but symptoms did not fully resolve  Today vision is fine and thinks reported vision concerns were primarily due to the migraine..   Some mild allergy drainage  Home last 3 weeks from [place].     No focal weakness, confusion, speech changes or other neurologic concerns..     She is scheduled next week to receive her infusion for her bowel disease    Patient Active Problem List   Diagnosis     Migraine without aura and without status migrainosus, not intractable     Allergic rhinitis     Inflammatory arthritis     Crohns disease     CARDIOVASCULAR SCREENING; LDL GOAL LESS THAN 160     Health Care Home     Inflammatory bowel disease     Pulmonary nodule     Encounter for dietary counseling and surveillance     Left sided ulcerative (chronic) colitis (H)     Overweight (BMI 25.0-29.9)     Advance care planning     Abnormal TSH     Past Surgical History:   Procedure Laterality Date     CARPAL TUNNEL RELEASE RT/LT  2006     HYSTERECTOMY, SUPRACERVICAL LAPAROSCOPIC  2008       Social History     Tobacco Use     Smoking status: Never Smoker     Smokeless tobacco: Never Used   Substance Use Topics     Alcohol use: No     Family History   Problem Relation Age of Onset     Arthritis Mother         rheumatoid arthritis     Cancer Mother         stomach     Gastrointestinal Disease Father           at 68 from Crohn's     Diabetes Father      Arthritis Sister         Rheumatoid arthritis     Cerebrovascular Disease Maternal Grandfather      Cerebrovascular Disease Paternal Grandfather      Breast Cancer Cousin      Breast Cancer Cousin          Current Outpatient Medications   Medication Sig Dispense Refill     balsalazide (COLAZAL) 750 MG capsule Take 2,250 mg by mouth 3 times daily.       Calcium Carbonate-Vitamin D (CALCIUM 600 + D OR) Take 1 tablet by mouth daily.       cetirizine (ZYRTEC) 10 MG tablet Take 10 mg by mouth daily. One a day       Cholecalciferol (VITAMIN D3 PO) Take 5,000 Units by mouth daily.       co-enzyme Q-10 (COQ10) 100 MG CAPS Take 1 capsule by mouth daily.       Cyanocobalamin ( VITAMIN B12 TR) 2000 MCG TBCR Take 1 tablet by mouth daily.       doxycycline hyclate (VIBRA-TABS) 100 MG tablet Take 1 tablet (100 mg) by mouth 2 times daily 20 tablet 0     Estrad-Estriol-Testost-Progest (BI-EST PROGEST-TESTOSTERONE) CREA Place 1 mg onto the skin At Bedtime Twice weekly       FOLIC ACID PO Take 2 mg by mouth daily. Takes 2 x 1 mg tabs       GLUTAMINE PO Take 2,250 mg by mouth daily.       hydroxychloroquine (PLAQUENIL) 200 MG tablet Take 200 mg by mouth 2 times daily.       hyoscyamine (LEVSIN/SL) 0.125 MG SL tablet   0     leucovorin (WELLCOVORIN) 5 MG tablet Take 5 mg by mouth daily.       LYSINE Take  by mouth every morning. Unknown strength       mesalamine (CANASA) 1000 MG suppository Place 1 suppository rectally At Bedtime.       Methotrexate Sodium (METHOTREXATE PO) Take 7.5 mg by mouth once a week Takes on Sundays  2.5 mg x 3 tabs= 7.5 mg       Multiple Vitamins-Minerals (ZINC PO) Take 20 mg by mouth daily       PEPPERMINT OIL Take 1 capsule by mouth daily.       polyethylene glycol (MIRALAX/GLYCOLAX) powder   4     PROBIOTIC PRODUCT PO Take 1 capsule by mouth daily.       pseudoePHEDrine (SUDAFED) 120 MG 12 hr tablet Take 120 mg by mouth every 12 hours.       Riboflavin  (VITAMIN B2 PO) Take 100 mg by mouth daily       rizatriptan (MAXALT-MLT) 10 MG ODT Take 1 tablet with onset of migraine, may repeat once after 2 hrs. Do not exceed 3 tabs in 24 hrs. 6 tablet 3     UNABLE TO FIND MEDICATION NAME: Estrofactor - take 2 tablets by mouth twice daily       Vedolizumab (ENTYVIO) 60 MG/ML injection        Allergies   Allergen Reactions     Penicillins Anaphylaxis     Remicade [Infliximab Injection] Nausea and Vomiting, Hives, Itching and Swelling     Arthritis     Shellfish Allergy Anaphylaxis     6 Mercaptopurine [Mercaptopurine]      Headaches and mouth/throat sores     Bactrim Nausea and Vomiting     Omnicef Nausea and Vomiting     Oxycodone Nausea     Prednisone      Racing heart       Reviewed and updated as needed this visit by Provider         Review of Systems   Constitutional, HEENT, cardiovascular, pulmonary, gi and gu systems are negative, except as otherwise noted.       Objective   Reported vitals:  [facility] 04/17/2014 (Exact Date)    healthy, alert and no distress  PSYCH: Alert and oriented times 3; coherent speech, normal   rate and volume, able to articulate logical thoughts, able   to abstract reason, no tangential thoughts, no hallucinations   or delusions  Her affect is normal  RESP: No cough, no audible wheezing, able to talk in full sentences  Remainder of exam unable to be completed due to telephone visits    Diagnostic Test Results:  Labs reviewed in Epic        Assessment/Plan:    ICD-10-CM    1. Acute sinusitis with coexisting condition requiring prophylactic treatment  J01.90 doxycycline hyclate (VIBRA-TABS) 100 MG tablet   2. Migraine without aura and without status migrainosus, not intractable  G43.009 rizatriptan (MAXALT-MLT) 10 MG ODT   3. Crohn's disease without complication, unspecified gastrointestinal tract location (H)  K50.90    4. Inflammatory arthritis  M19.90      Slightly atypical sinusitis symptoms but they are similar to past infection she has had so we  will go ahead and cover her with doxycycline which she has tolerated in the past.  We discussed close monitoring of neurologic based symptoms or any symptoms that seem somewhat atypical and close follow-up. Reviewed red flag symptoms that would precipitate the need for urgent or emergent f/u     Discussed she should call her GI doctor if she continues to feel ill next week when scheduled for her vedolizumab injection      Return if symptoms worsen or fail to improve.      Phone call duration:  9 minutes    Jaelyn Garcia MD

## 2020-06-03 ENCOUNTER — HOME INFUSION (PRE-WILLOW HOME INFUSION) (OUTPATIENT)
Dept: PHARMACY | Facility: CLINIC | Age: 60
End: 2020-06-03

## 2020-06-04 NOTE — PROGRESS NOTES
This is a recent snapshot of the patient's [facility] medical record.  For current drug dose and complete information and questions, call [phone] or In Basket pool, fv home infusion ([id number])  CSN Number:  [id number]

## 2020-06-05 ENCOUNTER — HOME INFUSION (PRE-WILLOW HOME INFUSION) (OUTPATIENT)
Dept: PHARMACY | Facility: CLINIC | Age: 60
End: 2020-06-05

## 2020-06-08 ENCOUNTER — TRANSFERRED RECORDS (OUTPATIENT)
Dept: HEALTH INFORMATION MANAGEMENT | Facility: CLINIC | Age: 60
End: 2020-06-08

## 2020-06-08 ENCOUNTER — HOME INFUSION (PRE-WILLOW HOME INFUSION) (OUTPATIENT)
Dept: PHARMACY | Facility: CLINIC | Age: 60
End: 2020-06-08

## 2020-06-08 LAB
ALT SERPL-CCNC: 24 IU/L (ref 5–35)
AST SERPL-CCNC: 31 U/L (ref 5–34)
CREAT SERPL-MCNC: 0.58 MG/DL (ref 0.5–1.3)
GFR SERPL CREATININE-BSD FRML MDRD: 112.7 ML/MIN/1.73M2

## 2020-06-08 NOTE — PROGRESS NOTES
This is a recent snapshot of the patient's [facility] medical record.  For current drug dose and complete information and questions, call [phone] or In Basket pool, fv home infusion ([id number])  CSN Number:  [id number]

## 2020-06-08 NOTE — PROGRESS NOTES
Ryan Cruz, 1960  Referred to [facility] ([facility]) for, Entyvio q 6 weeks.  Start of care on 6/9/2020.  Infusion location: Patient Home  Skilled Nursing will be provided by: [facility]  @ [phone]    Roman Mendez RN

## 2020-06-09 ENCOUNTER — HOME INFUSION (PRE-WILLOW HOME INFUSION) (OUTPATIENT)
Dept: PHARMACY | Facility: CLINIC | Age: 60
End: 2020-06-09

## 2020-06-10 NOTE — NURSING NOTE
Skilled Nurse visit in the  patient home to administer Entyvio 300mg IV.  No recent elevated temperature, fever, chills, productive cough, coughing for 3 weeks or longer or hemoptysis, abnormal vital signs, night sweats, chest pain. No  decrease in appetite, unexplained weight loss or fatigue.  No other new onset medical symptoms.  Current weight 152 pounds.  PIV placed left antecubital, single attempt.  Infusion completed without complication or reaction. Pt reports therapy is effective in managing symptoms related to therapy.    Mayela Jenkins RN  [phone]   [email or web address]

## 2020-06-27 ENCOUNTER — APPOINTMENT (OUTPATIENT)
Dept: LAB | Facility: CLINIC | Age: 60
End: 2020-06-27
Attending: INTERNAL MEDICINE
Payer: COMMERCIAL

## 2020-06-30 ENCOUNTER — TRANSFERRED RECORDS (OUTPATIENT)
Dept: HEALTH INFORMATION MANAGEMENT | Facility: CLINIC | Age: 60
End: 2020-06-30

## 2020-07-08 ENCOUNTER — OFFICE VISIT (OUTPATIENT)
Dept: FAMILY MEDICINE | Facility: CLINIC | Age: 60
End: 2020-07-08
Payer: COMMERCIAL

## 2020-07-08 VITALS
SYSTOLIC BLOOD PRESSURE: 120 MMHG | RESPIRATION RATE: 16 BRPM | DIASTOLIC BLOOD PRESSURE: 74 MMHG | HEART RATE: 89 BPM | OXYGEN SATURATION: 97 % | BODY MASS INDEX: 29.27 KG/M2 | HEIGHT: 61 IN | WEIGHT: 155 LBS

## 2020-07-08 DIAGNOSIS — N64.4 BREAST PAIN, LEFT: Primary | ICD-10-CM

## 2020-07-08 DIAGNOSIS — N63.20 LEFT BREAST LUMP: ICD-10-CM

## 2020-07-08 PROCEDURE — 99214 OFFICE O/P EST MOD 30 MIN: CPT | Mod: 25 | Performed by: FAMILY MEDICINE

## 2020-07-08 PROCEDURE — 90471 IMMUNIZATION ADMIN: CPT | Performed by: FAMILY MEDICINE

## 2020-07-08 PROCEDURE — 90750 HZV VACC RECOMBINANT IM: CPT | Performed by: FAMILY MEDICINE

## 2020-07-08 ASSESSMENT — MIFFLIN-ST. JEOR: SCORE: 1209.42

## 2020-07-08 NOTE — PATIENT INSTRUCTIONS
Please call [facility] in [place] at [phone] to schedule diagnostic mammogram and ultrasound.         At [facility], we strive to deliver an exceptional experience to you, every time we see you. If you receive a survey, please complete it as we do value your feedback.  If you have MyChart, you can expect to receive results automatically within 24 hours of their completion.  Your provider will send a note interpreting your results as well.   If you do not have MyChart, you should receive your results in about a week by mail.    Your care team:                            Family Medicine Internal Medicine   MD Loco Burdick MD Shantel Branch-Fleming, MD Katya Georgiev PA-C Megan Hill, APRN LAINE Cuenca MD Pediatrics   EDIS Farmer, MD Jennifer Obrien APRN CNP   MD Jeannette Brown MD Deborah Mielke, MD Kim Thein, APRN CNP      Clinic hours: Monday - Thursday 7 am-7 pm; Fridays 7 am-5 pm.   Urgent care: Monday - Friday 11 am-9 pm; Saturday and Sunday 9 am-5 pm.    Clinic: [phone]       [facility] [facility]: Monday - Thursday 8 am - 7 pm; Friday 8 am - 6 pm  [facility]: [phone]     Use [email or web address] for 24/7 diagnosis and treatment of dozens of conditions.

## 2020-07-08 NOTE — PROGRESS NOTES
Subjective     Ryan Cruz is a 60 year old female who presents to clinic today for the following health issues:    HPI   Concern - Breast problem  Onset: 3-4 months ago    Description:   Painful lump in left breast    Intensity: mild    Progression of Symptoms:  same    Accompanying Signs & Symptoms:  Pain     Previous history of similar problem:   None    Precipitating factors:   Worsened by: none    Alleviating factors:  Improved by: massage    Therapies Tried and outcome: Massage: mild relief      No breast discharge  Cousin with breast cancer    Feels like axilla lymph nodes intermittently swollen.   No fever, chills, night sweats.     GI- bowels are stable. \next infusion   Rheum- overall stable.     Patient Active Problem List   Diagnosis     Migraine without aura and without status migrainosus, not intractable     Allergic rhinitis     Inflammatory arthritis     Crohns disease     CARDIOVASCULAR SCREENING; LDL GOAL LESS THAN 160     Health Care Home     Inflammatory bowel disease     Pulmonary nodule     Encounter for dietary counseling and surveillance     Left sided ulcerative (chronic) colitis (H)     Overweight (BMI 25.0-29.9)     Advance care planning     Abnormal TSH     Past Surgical History:   Procedure Laterality Date     CARPAL TUNNEL RELEASE RT/LT       HYSTERECTOMY, SUPRACERVICAL LAPAROSCOPIC         Social History     Tobacco Use     Smoking status: Never Smoker     Smokeless tobacco: Never Used   Substance Use Topics     Alcohol use: No     Family History   Problem Relation Age of Onset     Arthritis Mother         rheumatoid arthritis     Cancer Mother         stomach     Gastrointestinal Disease Father          at 68 from Crohn's     Diabetes Father      Arthritis Sister         Rheumatoid arthritis     Cerebrovascular Disease Maternal Grandfather      Cerebrovascular Disease Paternal Grandfather      Breast Cancer Cousin      Breast Cancer Cousin          Current  Outpatient Medications   Medication Sig Dispense Refill     balsalazide (COLAZAL) 750 MG capsule Take 2,250 mg by mouth 3 times daily.       Calcium Carbonate-Vitamin D (CALCIUM 600 + D OR) Take 1 tablet by mouth daily.       cetirizine (ZYRTEC) 10 MG tablet Take 10 mg by mouth daily. One a day       Cholecalciferol (VITAMIN D3 PO) Take 5,000 Units by mouth daily.       co-enzyme Q-10 (COQ10) 100 MG CAPS Take 1 capsule by mouth daily.       Cyanocobalamin ( VITAMIN B12 TR) 2000 MCG TBCR Take 1 tablet by mouth daily.       doxycycline hyclate (VIBRA-TABS) 100 MG tablet Take 1 tablet (100 mg) by mouth 2 times daily 20 tablet 0     Estrad-Estriol-Testost-Progest (BI-EST PROGEST-TESTOSTERONE) CREA Place 1 mg onto the skin At Bedtime Twice weekly       FOLIC ACID PO Take 2 mg by mouth daily. Takes 2 x 1 mg tabs       GLUTAMINE PO Take 2,250 mg by mouth daily.       hydroxychloroquine (PLAQUENIL) 200 MG tablet Take 200 mg by mouth 2 times daily.       hyoscyamine (LEVSIN/SL) 0.125 MG SL tablet   0     leucovorin (WELLCOVORIN) 5 MG tablet Take 5 mg by mouth daily.       LYSINE Take  by mouth every morning. Unknown strength       mesalamine (CANASA) 1000 MG suppository Place 1 suppository rectally At Bedtime.       Methotrexate Sodium (METHOTREXATE PO) Take 7.5 mg by mouth once a week Takes on Sundays  2.5 mg x 3 tabs= 7.5 mg       Multiple Vitamins-Minerals (ZINC PO) Take 20 mg by mouth daily       PEPPERMINT OIL Take 1 capsule by mouth daily.       polyethylene glycol (MIRALAX/GLYCOLAX) powder   4     PROBIOTIC PRODUCT PO Take 1 capsule by mouth daily.       pseudoePHEDrine (SUDAFED) 120 MG 12 hr tablet Take 120 mg by mouth every 12 hours.       Riboflavin (VITAMIN B2 PO) Take 100 mg by mouth daily       rizatriptan (MAXALT-MLT) 10 MG ODT Take 1 tablet with onset of migraine, may repeat once after 2 hrs. Do not exceed 3 tabs in 24 hrs. 6 tablet 3     UNABLE TO FIND MEDICATION NAME: Estrofactor - take 2 tablets by mouth  "twice daily       Vedolizumab (ENTYVIO) 60 MG/ML injection        Allergies   Allergen Reactions     Penicillins Anaphylaxis     Remicade [Infliximab Injection] Nausea and Vomiting, Hives, Itching and Swelling     Arthritis     Shellfish Allergy Anaphylaxis     6 Mercaptopurine [Mercaptopurine]      Headaches and mouth/throat sores     Bactrim Nausea and Vomiting     Omnicef Nausea and Vomiting     Oxycodone Nausea     Prednisone      Racing heart       Reviewed and updated as needed this visit by Provider         Review of Systems   Constitutional, HEENT, cardiovascular, pulmonary, gi and gu systems are negative, except as otherwise noted.      Objective    /74 (BP Location: Left arm, Patient Position: Chair, Cuff Size: Adult Regular)   Pulse 89   Resp 16   Ht 1.556 m (5' 1.25\")   Wt 70.3 kg (155 lb)   LMP 04/17/2014 (Exact Date)   SpO2 97%   Breastfeeding No   BMI 29.05 kg/m    Body mass index is 29.05 kg/m .  Physical Exam   GENERAL: healthy, alert and no distress  NECK: no adenopathy, no asymmetry, masses, or scars and thyroid normal to palpation  RESP: lungs clear to auscultation - no rales, rhonchi or wheezes  BREAST: normal without masses, left breast with focal tenderness at 2:00 about 8 cm from the nipple, no nipple discharge and no palpable axillary masses or adenopathy  CV: regular rate and rhythm, normal S1 S2, no S3 or S4, no murmur, click or rub, no peripheral edema and peripheral pulses strong    Diagnostic Test Results:  none         Assessment & Plan     1. Breast pain, left  2. Left breast lump  Focal breast pain and intermittent feeling of lump in left breast.  Possible cyst versus other.  Discussed importance of follow-up with diagnostic imaging which she agrees with.  She is scheduled for her routine screening mammogram tomorrow but will see if she can change to the diagnostic imaging.  - MA Diagnostic Digital Bilateral; Future  - US Breast Left Complete 4 Quadrants; Future   " "  BMI:   Estimated body mass index is 29.05 kg/m  as calculated from the following:    Height as of this encounter: 1.556 m (5' 1.25\").    Weight as of this encounter: 70.3 kg (155 lb).           Return in about 2 weeks (around 7/22/2020), or if symptoms worsen or fail to improve.    Jaelyn Garcia MD  [facility]      "

## 2020-07-20 ENCOUNTER — ANCILLARY PROCEDURE (OUTPATIENT)
Dept: ULTRASOUND IMAGING | Facility: CLINIC | Age: 60
End: 2020-07-20
Attending: FAMILY MEDICINE
Payer: COMMERCIAL

## 2020-07-20 ENCOUNTER — ANCILLARY PROCEDURE (OUTPATIENT)
Dept: MAMMOGRAPHY | Facility: CLINIC | Age: 60
End: 2020-07-20
Attending: FAMILY MEDICINE
Payer: COMMERCIAL

## 2020-07-20 DIAGNOSIS — N64.4 BREAST PAIN, LEFT: ICD-10-CM

## 2020-07-20 DIAGNOSIS — N63.20 LEFT BREAST LUMP: ICD-10-CM

## 2020-07-20 PROCEDURE — G0279 TOMOSYNTHESIS, MAMMO: HCPCS

## 2020-07-20 PROCEDURE — 77066 DX MAMMO INCL CAD BI: CPT

## 2020-07-20 PROCEDURE — 76642 ULTRASOUND BREAST LIMITED: CPT | Mod: LT

## 2020-07-21 ENCOUNTER — HOME INFUSION (PRE-WILLOW HOME INFUSION) (OUTPATIENT)
Dept: PHARMACY | Facility: CLINIC | Age: 60
End: 2020-07-21

## 2020-07-21 ENCOUNTER — DOCUMENTATION ONLY (OUTPATIENT)
Dept: PHARMACY | Facility: CLINIC | Age: 60
End: 2020-07-21

## 2020-07-21 NOTE — PROGRESS NOTES
Skilled Nurse visit in the  patient home to administer Entyvio 300mg IV.  No recent elevated temperature, fever, chills, productive cough, coughing for 3 weeks or longer or hemoptysis, abnormal vital signs, night sweats, chest pain. No  decrease in appetite, unexplained weight loss or fatigue.  No other new onset medical symptoms.  Current weight 155 pounds.  PIV placed left a/c, single attempt.   Infusion completed without complication or reaction. Pt reports therapy is effective in managing symptoms related to therapy.    Mayela Jenkins RN  [phone]   [email or web address]

## 2020-08-31 ENCOUNTER — HOME INFUSION (PRE-WILLOW HOME INFUSION) (OUTPATIENT)
Dept: PHARMACY | Facility: CLINIC | Age: 60
End: 2020-08-31

## 2020-08-31 ENCOUNTER — TRANSFERRED RECORDS (OUTPATIENT)
Dept: HEALTH INFORMATION MANAGEMENT | Facility: CLINIC | Age: 60
End: 2020-08-31

## 2020-09-01 NOTE — PROGRESS NOTES
This is a recent snapshot of the patient's [facility] medical record.  For current drug dose and complete information and questions, call [phone] or In Basket pool, fv home infusion ([id number])  CSN Number:  [id number]

## 2020-09-21 ENCOUNTER — TRANSFERRED RECORDS (OUTPATIENT)
Dept: HEALTH INFORMATION MANAGEMENT | Facility: CLINIC | Age: 60
End: 2020-09-21

## 2020-09-22 ENCOUNTER — E-VISIT (OUTPATIENT)
Dept: FAMILY MEDICINE | Facility: CLINIC | Age: 60
End: 2020-09-22
Payer: COMMERCIAL

## 2020-09-22 DIAGNOSIS — Z53.9 ERRONEOUS ENCOUNTER--DISREGARD: Primary | ICD-10-CM

## 2020-09-22 NOTE — TELEPHONE ENCOUNTER
Provider E-Visit time total (minutes): < 5 min  Patient did not respond. Will close encounter and no charge.

## 2020-09-29 ENCOUNTER — TRANSFERRED RECORDS (OUTPATIENT)
Dept: HEALTH INFORMATION MANAGEMENT | Facility: CLINIC | Age: 60
End: 2020-09-29

## 2020-10-02 ENCOUNTER — MYC MEDICAL ADVICE (OUTPATIENT)
Dept: FAMILY MEDICINE | Facility: CLINIC | Age: 60
End: 2020-10-02

## 2020-10-05 ENCOUNTER — HOME INFUSION (PRE-WILLOW HOME INFUSION) (OUTPATIENT)
Dept: PHARMACY | Facility: CLINIC | Age: 60
End: 2020-10-05

## 2020-10-08 ENCOUNTER — TELEPHONE (OUTPATIENT)
Dept: FAMILY MEDICINE | Facility: CLINIC | Age: 60
End: 2020-10-08

## 2020-10-08 ENCOUNTER — MYC MEDICAL ADVICE (OUTPATIENT)
Dept: FAMILY MEDICINE | Facility: CLINIC | Age: 60
End: 2020-10-08

## 2020-10-08 ENCOUNTER — TRANSFERRED RECORDS (OUTPATIENT)
Dept: HEALTH INFORMATION MANAGEMENT | Facility: CLINIC | Age: 60
End: 2020-10-08

## 2020-10-08 LAB
ALT SERPL-CCNC: 21 IU/L (ref 5–35)
AST SERPL-CCNC: 28 U/L (ref 5–34)
CREAT SERPL-MCNC: 0.56 MG/DL (ref 0.5–1.3)

## 2020-10-08 NOTE — TELEPHONE ENCOUNTER
Reason for Call:  Other appointment    Detailed comments: Zeinab was scheduled for a virtual appointment today at 9am and by 9:35 she had not heard anything. She needed to cancel as she needed to leave for another appointment.  Did not reschedule at this time but stated if you needed to reach her you could do so this afternoon.      Phone Number Patient can be reached at: Home number on file [phone] (home) or Cell number on file:    Telephone Information:   Mobile [phone]       Best Time: Any    Can we leave a detailed message on this number? YES    Call taken on 10/8/2020 at 9:35 AM by Mel Blue

## 2020-10-12 ENCOUNTER — HOME INFUSION (PRE-WILLOW HOME INFUSION) (OUTPATIENT)
Dept: PHARMACY | Facility: CLINIC | Age: 60
End: 2020-10-12

## 2020-10-13 ENCOUNTER — HOME INFUSION (PRE-WILLOW HOME INFUSION) (OUTPATIENT)
Dept: PHARMACY | Facility: CLINIC | Age: 60
End: 2020-10-13

## 2020-10-14 NOTE — PROGRESS NOTES
This is a recent snapshot of the patient's [facility] medical record.  For current drug dose and complete information and questions, call [phone] or In Basket pool, fv home infusion ([id number])  CSN Number:  [id number]

## 2020-10-17 ENCOUNTER — MYC MEDICAL ADVICE (OUTPATIENT)
Dept: FAMILY MEDICINE | Facility: CLINIC | Age: 60
End: 2020-10-17

## 2020-10-19 ENCOUNTER — VIRTUAL VISIT (OUTPATIENT)
Dept: FAMILY MEDICINE | Facility: CLINIC | Age: 60
End: 2020-10-19
Payer: COMMERCIAL

## 2020-10-19 DIAGNOSIS — H01.025 SQUAMOUS BLEPHARITIS OF LEFT LOWER EYELID: Primary | ICD-10-CM

## 2020-10-19 PROCEDURE — 99213 OFFICE O/P EST LOW 20 MIN: CPT | Mod: TEL | Performed by: FAMILY MEDICINE

## 2020-10-19 NOTE — PROGRESS NOTES
"Ryan Cruz is a 60 year old female who is being evaluated via a billable telephone visit.      The patient has been notified of following:     \"This telephone visit will be conducted via a call between you and your physician/provider. We have found that certain health care needs can be provided without the need for a physical exam.  This service lets us provide the care you need with a short phone conversation.  If a prescription is necessary we can send it directly to your pharmacy.  If lab work is needed we can place an order for that and you can then stop by our lab to have the test done at a later time.    Telephone visits are billed at different rates depending on your insurance coverage. During this emergency period, for some insurers they may be billed the same as an in-person visit.  Please reach out to your insurance provider with any questions.    If during the course of the call the physician/provider feels a telephone visit is not appropriate, you will not be charged for this service.\"    Patient has given verbal consent for Telephone visit?  Yes   Converted to partial video visit.    What phone number would you like to be contacted at? [phone]     How would you like to obtain your AVS? MyChart    Subjective     Ryan Cruz is a 60 year old female who presents via phone visit today for the following health issues:    HPI     Patient reports that had eye irritation left only.  No injury noted. No vision change.  Less drainage.    White of eye red intermittently with tearing. Mild crusting in corner of eyes.  Skin scaling on left lower eyelid.  No pain in eye.     was treated by eye doctor for allergy in eyes but this is different.               Review of Systems   Constitutional, HEENT, cardiovascular, pulmonary, gi and gu systems are negative, except as otherwise noted.       Objective          Vitals:  No vitals were obtained today due to virtual visit.    healthy, alert and no " distress  EYES:  Video portion of visit used to evaluate the eye.   No conjunctival injection noted.  EOMI.  Pupils reactive.  Left lower eyelid medially with erythema and scaling noted.  No induration apparent on video evaluation.    PSYCH: Alert and oriented times 3; coherent speech, normal   rate and volume, able to articulate logical thoughts, able   to abstract reason, no tangential thoughts, no hallucinations   or delusions  Her affect is normal  RESP: No cough, no audible wheezing, able to talk in full sentences  Remainder of exam unable to be completed due to telephone visits            Assessment/Plan:    Assessment & Plan   Problem List Items Addressed This Visit     None      Visit Diagnoses     Squamous blepharitis of left lower eyelid    -  Primary       instructions for eye wash given.  Use zyrtec as needed orally for itching symptoms.    Follow up if vision change or pain in eye noted or significant change in iris appearance discussed with patient.             Patient Instructions   Topical treatment with warm compresses and an eyelid wash is recommended.  See below for for instructions for eyelid wash.          Patient Education     What Is Blepharitis?    Blepharitis is a redness and swelling (inflammation) of the eyelids. The membrane covering the inside of your eyelid and the white of your eye may also become inflamed. Blepharitis can be caused by germs (bacteria) on your eyelids or on the skin around your eyes. Dandruff or oily skin can also cause blepharitis. Wearing contact lenses or makeup can make your symptoms worse. Blepharitis can t always be cured. But it can be controlled.  What are the signs and symptoms?  When you have blepharitis, oil and bacteria coat your eyelids near the base of your eyelashes. This can cause:    Redness, irritation, and tearing of your eyelids    Swollen, tender eyelids    Blurred vision    Itching around your eyelashes    Greasy flakes or scales around your  eyelashes    Hard crusts at the base of your eyelashes. These crusts may cause your eyelashes to fall out.  Flakes or crusts can form during the night. If this happens, it may be hard for you to open your eyes in the morning. If blepharitis is not treated promptly, it can lead to an infection at the base of an eyelash or oil gland. This infection is called a stye. Left untreated, it may become a chronic cyst. You may need surgery to remove it.  Date Last Reviewed: 3/1/2018    6772-0935 [employer]. [address] [place] [address]. All rights reserved. This information is not intended as a substitute for professional medical care. Always follow your healthcare professional's instructions.           Patient Education     Treating Blepharitis: Self-Care    To treat the problem, keep your eyelids clean. Warm compresses can reduce redness and swelling, and help clean your eyelids, too. You may also need to wash the area gently with an eyelid scrub when you wake up.  To apply a warm compress:  1. Wash your hands with soap and warm water.  2. Wet a clean washcloth with warm water. Then wring it out.  3. Close your eyes and place the washcloth over your eyelids for 3 to 5 minutes. This helps loosen scales or crusts.  4. Wet the washcloth again as often as needed to keep it warm.  Repeat 2 or more times a day. Use a clean washcloth each time.  To use an eyelid scrub:  1. Wash your hands with soap and warm water.  2. Use a ready-made eyelid scrub. Or mix 3 drops of baby shampoo in 1/4 cup of warm water.  3. Dip a lint-free pad, cotton swab, or clean washcloth in the scrub.  4. Close one eye and gently scrub the base of the eyelid.  5. Rinse the lid in cool water and dry with a clean towel.  6. Repeat on your other eye.  Date Last Reviewed: 3/1/2018    4568-9051 [employer]. [address] [place] [address]. All rights reserved. This information is not intended as a substitute for  professional medical care. Always follow your healthcare professional's instructions.             Return in about 3 days (around 10/22/2020) for as needed for persistent symptoms or worsening symptoms.    Ronna Saleh MD  [facility]    Phone call duration:  12 minutes - telephone and video combination (3 min video)        FYI to PCP

## 2020-10-19 NOTE — PATIENT INSTRUCTIONS
Topical treatment with warm compresses and an eyelid wash is recommended.  See below for for instructions for eyelid wash.          Patient Education     What Is Blepharitis?    Blepharitis is a redness and swelling (inflammation) of the eyelids. The membrane covering the inside of your eyelid and the white of your eye may also become inflamed. Blepharitis can be caused by germs (bacteria) on your eyelids or on the skin around your eyes. Dandruff or oily skin can also cause blepharitis. Wearing contact lenses or makeup can make your symptoms worse. Blepharitis can t always be cured. But it can be controlled.  What are the signs and symptoms?  When you have blepharitis, oil and bacteria coat your eyelids near the base of your eyelashes. This can cause:    Redness, irritation, and tearing of your eyelids    Swollen, tender eyelids    Blurred vision    Itching around your eyelashes    Greasy flakes or scales around your eyelashes    Hard crusts at the base of your eyelashes. These crusts may cause your eyelashes to fall out.  Flakes or crusts can form during the night. If this happens, it may be hard for you to open your eyes in the morning. If blepharitis is not treated promptly, it can lead to an infection at the base of an eyelash or oil gland. This infection is called a stye. Left untreated, it may become a chronic cyst. You may need surgery to remove it.  Date Last Reviewed: 3/1/2018    9383-0592 [employer]. [address] [place] [address]. All rights reserved. This information is not intended as a substitute for professional medical care. Always follow your healthcare professional's instructions.           Patient Education     Treating Blepharitis: Self-Care    To treat the problem, keep your eyelids clean. Warm compresses can reduce redness and swelling, and help clean your eyelids, too. You may also need to wash the area gently with an eyelid scrub when you wake up.  To apply a warm  compress:  1. Wash your hands with soap and warm water.  2. Wet a clean washcloth with warm water. Then wring it out.  3. Close your eyes and place the washcloth over your eyelids for 3 to 5 minutes. This helps loosen scales or crusts.  4. Wet the washcloth again as often as needed to keep it warm.  Repeat 2 or more times a day. Use a clean washcloth each time.  To use an eyelid scrub:  1. Wash your hands with soap and warm water.  2. Use a ready-made eyelid scrub. Or mix 3 drops of baby shampoo in 1/4 cup of warm water.  3. Dip a lint-free pad, cotton swab, or clean washcloth in the scrub.  4. Close one eye and gently scrub the base of the eyelid.  5. Rinse the lid in cool water and dry with a clean towel.  6. Repeat on your other eye.  Date Last Reviewed: 3/1/2018    4215-0492 [email or web address]. [address] [place] [address], [place] [address]. All rights reserved. This information is not intended as a substitute for professional medical care. Always follow your healthcare professional's instructions.

## 2020-11-10 ENCOUNTER — HOME INFUSION (PRE-WILLOW HOME INFUSION) (OUTPATIENT)
Dept: PHARMACY | Facility: CLINIC | Age: 60
End: 2020-11-10

## 2020-11-11 NOTE — PROGRESS NOTES
This is a recent snapshot of the patient's [facility] medical record.  For current drug dose and complete information and questions, call [phone] or In Basket pool, fv home infusion ([id number])  CSN Number:  [id number]

## 2020-11-24 NOTE — PROGRESS NOTES
This is a recent snapshot of the patient's [facility] medical record.  For current drug dose and complete information and questions, call [phone] or In Basket pool, fv home infusion ([id number])  CSN Number:  [id number]

## 2020-11-30 ENCOUNTER — APPOINTMENT (OUTPATIENT)
Dept: LAB | Facility: CLINIC | Age: 60
End: 2020-11-30
Attending: INTERNAL MEDICINE
Payer: COMMERCIAL

## 2020-12-08 ENCOUNTER — HOME INFUSION (PRE-WILLOW HOME INFUSION) (OUTPATIENT)
Dept: PHARMACY | Facility: CLINIC | Age: 60
End: 2020-12-08

## 2020-12-08 ENCOUNTER — DOCUMENTATION ONLY (OUTPATIENT)
Dept: PHARMACY | Facility: CLINIC | Age: 60
End: 2020-12-08

## 2020-12-08 NOTE — PROGRESS NOTES
Skilled Nurse visit in the  patient home to administer Entyvio 300mg IV.  No recent elevated temperature, fever, chills, productive cough, coughing for 3 weeks or longer or hemoptysis, abnormal vital signs, night sweats, chest pain. No  decrease in appetite, unexplained weight loss or fatigue.  Reports ulcerative colitis symptoms of multiple loose stools with small amount of rectal bleeding is gradually improving.   Current weight 150 pounds.  PIV placed left a/c, single attempt.   Infusion completed without complication or reaction. Pt reports therapy is effective in managing symptoms related to therapy.     Mayela Jenkins RN  [phone]    [email or web address]

## 2020-12-09 NOTE — PROGRESS NOTES
This is a recent snapshot of the patient's [facility] medical record.  For current drug dose and complete information and questions, call [phone] or In Basket pool, fv home infusion ([id number])  CSN Number:  [id number]

## 2021-01-05 ENCOUNTER — HOME INFUSION (PRE-WILLOW HOME INFUSION) (OUTPATIENT)
Dept: PHARMACY | Facility: CLINIC | Age: 61
End: 2021-01-05

## 2021-01-05 ENCOUNTER — DOCUMENTATION ONLY (OUTPATIENT)
Dept: PHARMACY | Facility: CLINIC | Age: 61
End: 2021-01-05

## 2021-01-05 NOTE — PROGRESS NOTES
Skilled Nurse visit in the  patient home to administer Entyvio (vedolizumab) 300 mg IV via gravity over 30 minutes every 4 weeks.  No recent elevated temperature, fever, chills, productive cough, coughing for 3 weeks or longer or hemoptysis, abnormal vital signs, night sweats, chest pain. No  decrease in your appetite, unexplained weight loss or fatigue.  No other new onset medical symptoms.  Current weight 155 lbs.  PIV placed right AC, 1 attempt. Infusion completed without complication or reaction. Pt reports therapy is effective  in managing symptoms related to therapy.      Elaine Jenkins RN, BSN  [facility]  [phone]  [email or web address]

## 2021-01-06 NOTE — PROGRESS NOTES
This is a recent snapshot of the patient's [facility] medical record.  For current drug dose and complete information and questions, call [phone] or In Basket pool, fv home infusion ([id number])  CSN Number:  [id number]

## 2021-01-13 ENCOUNTER — TRANSFERRED RECORDS (OUTPATIENT)
Dept: HEALTH INFORMATION MANAGEMENT | Facility: CLINIC | Age: 61
End: 2021-01-13

## 2021-01-13 LAB
ALT SERPL-CCNC: 32 U/L (ref 0–78)
ALT SERPL-CCNC: 32 U/L (ref 0–78)
AST SERPL-CCNC: 27 U/L (ref 0–37)
AST SERPL-CCNC: 27 U/L (ref 0–37)
CREAT SERPL-MCNC: 0.6 MG/DL (ref 0.6–1.02)
CREAT SERPL-MCNC: 0.6 MG/DL (ref 0.6–1.02)
GFR SERPL CREATININE-BSD FRML MDRD: >60 ML/MIN/1.73M2
GFR SERPL CREATININE-BSD FRML MDRD: >60 ML/MIN/1.73M2

## 2021-01-15 ENCOUNTER — MYC MEDICAL ADVICE (OUTPATIENT)
Dept: FAMILY MEDICINE | Facility: CLINIC | Age: 61
End: 2021-01-15

## 2021-01-15 ENCOUNTER — HEALTH MAINTENANCE LETTER (OUTPATIENT)
Age: 61
End: 2021-01-15

## 2021-02-01 ENCOUNTER — APPOINTMENT (OUTPATIENT)
Dept: LAB | Facility: CLINIC | Age: 61
End: 2021-02-01
Attending: INTERNAL MEDICINE
Payer: COMMERCIAL

## 2021-02-02 ENCOUNTER — DOCUMENTATION ONLY (OUTPATIENT)
Dept: PHARMACY | Facility: CLINIC | Age: 61
End: 2021-02-02

## 2021-02-02 ENCOUNTER — HOME INFUSION (PRE-WILLOW HOME INFUSION) (OUTPATIENT)
Dept: PHARMACY | Facility: CLINIC | Age: 61
End: 2021-02-02

## 2021-02-02 NOTE — PROGRESS NOTES
Skilled Nurse visit in the  patient home to administer Entyvio (vedolizumab) 300 mg IV via gravity over 30 minutes .  No recent elevated temperature, fever, chills, productive cough, coughing for 3 weeks or longer or hemoptysis, abnormal vital signs, night sweats, chest pain. No  decrease in your appetite, unexplained weight loss or fatigue.  No other new onset medical symptoms.  Current weight 148 lbs .  PIV placed right AC , 1  attempt/s.Infusion completed without complication or reaction. Pt reports therapy is effective in managing symptoms related to therapy.    Elaine Jenkins RN, BSN  [facility]  [phone]  [email or web address]

## 2021-02-03 NOTE — PROGRESS NOTES
This is a recent snapshot of the patient's [facility] medical record.  For current drug dose and complete information and questions, call [phone] or In Basket pool, fv home infusion ([id number])  CSN Number:  [id number]

## 2021-02-08 ENCOUNTER — TRANSFERRED RECORDS (OUTPATIENT)
Dept: HEALTH INFORMATION MANAGEMENT | Facility: CLINIC | Age: 61
End: 2021-02-08

## 2021-02-11 ENCOUNTER — TRANSFERRED RECORDS (OUTPATIENT)
Dept: HEALTH INFORMATION MANAGEMENT | Facility: CLINIC | Age: 61
End: 2021-02-11

## 2021-02-16 ENCOUNTER — TRANSFERRED RECORDS (OUTPATIENT)
Dept: HEALTH INFORMATION MANAGEMENT | Facility: CLINIC | Age: 61
End: 2021-02-16

## 2021-02-22 ENCOUNTER — TRANSFERRED RECORDS (OUTPATIENT)
Dept: HEALTH INFORMATION MANAGEMENT | Facility: CLINIC | Age: 61
End: 2021-02-22

## 2021-02-24 ENCOUNTER — HOME INFUSION (PRE-WILLOW HOME INFUSION) (OUTPATIENT)
Dept: PHARMACY | Facility: CLINIC | Age: 61
End: 2021-02-24

## 2021-02-25 ENCOUNTER — HOME INFUSION (PRE-WILLOW HOME INFUSION) (OUTPATIENT)
Dept: PHARMACY | Facility: CLINIC | Age: 61
End: 2021-02-25

## 2021-02-25 NOTE — PROGRESS NOTES
This is a recent snapshot of the patient's [facility] medical record.  For current drug dose and complete information and questions, call [phone] or In Basket pool, fv home infusion ([id number])  CSN Number:  [id number]

## 2021-03-01 NOTE — PROGRESS NOTES
This is a recent snapshot of the patient's [facility] medical record.  For current drug dose and complete information and questions, call [phone] or In Basket pool, fv home infusion ([id number])  CSN Number:  [id number]

## 2021-03-10 ENCOUNTER — VIRTUAL VISIT (OUTPATIENT)
Dept: FAMILY MEDICINE | Facility: CLINIC | Age: 61
End: 2021-03-10
Payer: COMMERCIAL

## 2021-03-10 DIAGNOSIS — M79.673 PAIN OF FOOT, UNSPECIFIED LATERALITY: Primary | ICD-10-CM

## 2021-03-10 DIAGNOSIS — M19.90 INFLAMMATORY ARTHRITIS: ICD-10-CM

## 2021-03-10 DIAGNOSIS — K50.90 CROHN'S DISEASE WITHOUT COMPLICATION, UNSPECIFIED GASTROINTESTINAL TRACT LOCATION (H): ICD-10-CM

## 2021-03-10 PROCEDURE — 99213 OFFICE O/P EST LOW 20 MIN: CPT | Mod: 95 | Performed by: FAMILY MEDICINE

## 2021-03-10 NOTE — PATIENT INSTRUCTIONS
Go to the ER this evening if fever, weakness, worsening pain and swelling.  Continue to avoid weightbearing and use crutches.  Be seen face-to-face in the next 24 hours if possible for further evaluation.

## 2021-03-10 NOTE — PROGRESS NOTES
"Ryan is a 60 year old who is being evaluated via a billable video visit.      How would you like to obtain your AVS? MyChart  If the video visit is dropped, the invitation should be resent by: Text to cell phone: see Epic  Will anyone else be joining your video visit? No    Video Start Time: 1:28 PM    Assessment & Plan     Pain of foot, unspecified laterality  Unclear etiology of pain.  Certainly gout is high in the differential given the intensity of the pain and fairly abrupt onset without obvious trigger.  Also fracture, sprain, inflammatory arthritis, cellulitis/infxn, etc in ddx.  I recommended she should be seen face-to-face to evaluate this further.  She cannot really handle any significant amount of NSAIDs and is intolerant to prednisone because of tachycardia.  Could consider colchicine if this continues to present like gout but needs x-ray and eyes on exam to rule out the other etiology.  Recommend office visit with sports medicine within the next 24 hours if possible. Reviewed red flag symptoms that would precipitate the need for routine, urgent or emergent f/u   - Orthopedic & Spine  Referral; Future    Inflammatory arthritis  Has been stable.  On biologic medication    Crohn's disease without complication, unspecified gastrointestinal tract location (H)  On Biologics         BMI:   Estimated body mass index is 29.05 kg/m  as calculated from the following:    Height as of 7/8/20: 1.556 m (5' 1.25\").    Weight as of 7/8/20: 70.3 kg (155 lb).       Patient Instructions   Go to the ER this evening if fever, weakness, worsening pain and swelling.  Continue to avoid weightbearing and use crutches.  Be seen face-to-face in the next 24 hours if possible for further evaluation.      Return in about 1 day (around 3/11/2021) for in person.    Jaelyn Garcia MD  [facility]    Nelda Davis is a 60 year old who presents for the following health issues   HPI "     Foot pain:  Started Sat afternoon (4 days ago) and was sore. When got up that night to go to the bathroom fell due to the pain.  Was not swollen or hot  Initially.  Thought it was improving but now it is worsening in the last 24 hours.  Pain is inner/medial midfoot.   Notes certain movements of her ankle are quite painful.  Hard for foot to rotate to the right   Hard to put sock on it due to pain and can't stand on it.   Pain is starting to go up the leg (radiating up her shin)  Starting to get numb or tingling sensation to. Now little swollen and warm too    Currently is in good health.  She began a new biologic a few weeks ago.  stelara x 2 weeks now    Years ago did have gout   Her inflammatory arthritis has been controlled.     Crutches to get around  Can't tolerate nsaids generally. Did try one aleve this am but it did not help at all..   Ice doesn't really help either.   When sitting very still no pain but as soon as move foot very painful        Objective           Vitals:  No vitals were obtained today due to virtual visit.    Physical Exam   GENERAL: Healthy, alert and no distress  EYES: Eyes grossly normal to inspection.  No discharge or erythema, or obvious scleral/conjunctival abnormalities.  RESP: No audible wheeze, cough, or visible cyanosis.  No visible retractions or increased work of breathing.    MS: Inner medial left foot with no skin changes but very mild swelling present.  SKIN: Visible skin clear. No significant rash, abnormal pigmentation or lesions.  NEURO: Cranial nerves grossly intact.  Mentation and speech appropriate for age.  PSYCH: Mentation appears normal, affect normal/bright, judgement and insight intact, normal speech and appearance well-groomed.          Video-Visit Details    Type of service:  Video Visit    Video End Time:1:43 PM    Originating Location (pt. Location): Home    Distant Location (provider location):  [facility]     Platform used for  Video Visit: Christ[employer]

## 2021-03-11 ENCOUNTER — TRANSFERRED RECORDS (OUTPATIENT)
Dept: HEALTH INFORMATION MANAGEMENT | Facility: CLINIC | Age: 61
End: 2021-03-11

## 2021-03-12 ENCOUNTER — IMMUNIZATION (OUTPATIENT)
Dept: PEDIATRICS | Facility: CLINIC | Age: 61
End: 2021-03-12
Payer: COMMERCIAL

## 2021-03-12 PROCEDURE — 0001A PR COVID VAC PFIZER DIL RECON 30 MCG/0.3 ML IM: CPT

## 2021-03-12 PROCEDURE — 91300 PR COVID VAC PFIZER DIL RECON 30 MCG/0.3 ML IM: CPT

## 2021-03-17 ENCOUNTER — HOME INFUSION (PRE-WILLOW HOME INFUSION) (OUTPATIENT)
Dept: PHARMACY | Facility: CLINIC | Age: 61
End: 2021-03-17

## 2021-03-18 NOTE — PROGRESS NOTES
This is a recent snapshot of the patient's [facility] medical record.  For current drug dose and complete information and questions, call [phone] or In Basket pool, fv home infusion ([id number])  CSN Number:  [id number]

## 2021-04-02 ENCOUNTER — IMMUNIZATION (OUTPATIENT)
Dept: PEDIATRICS | Facility: CLINIC | Age: 61
End: 2021-04-02
Attending: INTERNAL MEDICINE
Payer: COMMERCIAL

## 2021-04-02 PROCEDURE — 0002A PR COVID VAC PFIZER DIL RECON 30 MCG/0.3 ML IM: CPT

## 2021-04-02 PROCEDURE — 91300 PR COVID VAC PFIZER DIL RECON 30 MCG/0.3 ML IM: CPT

## 2021-04-19 ENCOUNTER — TRANSFERRED RECORDS (OUTPATIENT)
Dept: HEALTH INFORMATION MANAGEMENT | Facility: CLINIC | Age: 61
End: 2021-04-19

## 2021-04-29 NOTE — PROGRESS NOTES
[facility]    [place] History and Physical        Girl Riana Huggins Patient Status:      2021 MRN [id number]   Location [facility]  3SE-N Attending Mikhail Mart, [address] Day # 1 PCP    Consultant No primary care provide This is a recent snapshot of the patient's [facility] medical record.  For current drug dose and complete information and questions, call [phone] or In Basket pool, fv home infusion ([id number])  CSN Number:  [id number]       Glucose 2 Hr       Glucose 3 Hr       HgB A1c       Vitamin D         2nd Trimester Labs (GA 24-41w)     Test Value Date Time    HCT  29.3 % 04/29/21 0642       29.3 % 04/27/21 2026       31.1 % 04/24/21 1236       31.7 % 04/22/21 1115       31.9 % 04 Risk Assessment       Quad - Down Screen Risk Estimate (Required questions in OE to answer)       Quad - Down Maternal Age Risk (Required questions in OE to answer)       Quad - Trisomy 18 screen Risk Estimate (Required questions in OE to answer)       AFP reflex present bilaterally  Ear: Normal position and canals patent bilaterally  Nose: Nares patent bilaterally  Mouth: Oral mucosa moist and palate intact  Neck:  supple, trachea midline  Respiratory: normal respiratory rate and clear to auscultation bilat

## 2021-06-09 ENCOUNTER — TRANSFERRED RECORDS (OUTPATIENT)
Dept: HEALTH INFORMATION MANAGEMENT | Facility: CLINIC | Age: 61
End: 2021-06-09

## 2021-06-09 LAB
ALT SERPL-CCNC: 27 IU/L (ref 5–35)
AST SERPL-CCNC: 29 U/L (ref 5–34)
CREAT SERPL-MCNC: 0.65 MG/DL (ref 0.5–1.3)

## 2021-06-15 ENCOUNTER — TRANSFERRED RECORDS (OUTPATIENT)
Dept: HEALTH INFORMATION MANAGEMENT | Facility: CLINIC | Age: 61
End: 2021-06-15

## 2021-06-15 NOTE — PATIENT INSTRUCTIONS
I would recommend a pneumovax vaccination this fall (10/2021)    Schedule mammogram for July    Preventive Health Recommendations  Female Ages 50 - 64    Yearly exam: See your health care provider every year in order to  o Review health changes.   o Discuss preventive care.    o Review your medicines if your doctor has prescribed any.      Get a Pap test every three years (unless you have an abnormal result and your provider advises testing more often).    If you get Pap tests with HPV test, you only need to test every 5 years, unless you have an abnormal result.     You do not need a Pap test if your uterus was removed (hysterectomy) and you have not had cancer.    You should be tested each year for STDs (sexually transmitted diseases) if you're at risk.     Have a mammogram every 1 to 2 years.    Have a colonoscopy at age 50, or have a yearly FIT test (stool test). These exams screen for colon cancer.      Have a cholesterol test every 5 years, or more often if advised.    Have a diabetes test (fasting glucose) every three years. If you are at risk for diabetes, you should have this test more often.     If you are at risk for osteoporosis (brittle bone disease), think about having a bone density scan (DEXA).    Shots: Get a flu shot each year. Get a tetanus shot every 10 years.    Nutrition:     Eat at least 5 servings of fruits and vegetables each day.    Eat whole-grain bread, whole-wheat pasta and brown rice instead of white grains and rice.    Get adequate Calcium and Vitamin D.     Lifestyle    Exercise at least 150 minutes a week (30 minutes a day, 5 days a week). This will help you control your weight and prevent disease.    Limit alcohol to one drink per day.    No smoking.     Wear sunscreen to prevent skin cancer.     See your dentist every six months for an exam and cleaning.    See your eye doctor every 1 to 2 years.

## 2021-06-16 ENCOUNTER — OFFICE VISIT (OUTPATIENT)
Dept: FAMILY MEDICINE | Facility: CLINIC | Age: 61
End: 2021-06-16
Payer: COMMERCIAL

## 2021-06-16 VITALS
WEIGHT: 153.5 LBS | BODY MASS INDEX: 28.25 KG/M2 | DIASTOLIC BLOOD PRESSURE: 58 MMHG | HEIGHT: 62 IN | TEMPERATURE: 98.3 F | HEART RATE: 84 BPM | SYSTOLIC BLOOD PRESSURE: 130 MMHG

## 2021-06-16 DIAGNOSIS — G43.009 MIGRAINE WITHOUT AURA AND WITHOUT STATUS MIGRAINOSUS, NOT INTRACTABLE: ICD-10-CM

## 2021-06-16 DIAGNOSIS — K50.90 CROHN'S DISEASE WITHOUT COMPLICATION, UNSPECIFIED GASTROINTESTINAL TRACT LOCATION (H): ICD-10-CM

## 2021-06-16 DIAGNOSIS — F43.9 SITUATIONAL STRESS: ICD-10-CM

## 2021-06-16 DIAGNOSIS — B00.1 RECURRENT COLD SORES: ICD-10-CM

## 2021-06-16 DIAGNOSIS — M19.90 INFLAMMATORY ARTHRITIS: ICD-10-CM

## 2021-06-16 DIAGNOSIS — Z00.00 ROUTINE GENERAL MEDICAL EXAMINATION AT A HEALTH CARE FACILITY: Primary | ICD-10-CM

## 2021-06-16 DIAGNOSIS — Z12.4 SCREENING FOR CERVICAL CANCER: ICD-10-CM

## 2021-06-16 DIAGNOSIS — Z80.3 FAMILY HISTORY OF MALIGNANT NEOPLASM OF BREAST: ICD-10-CM

## 2021-06-16 PROCEDURE — 99396 PREV VISIT EST AGE 40-64: CPT | Performed by: FAMILY MEDICINE

## 2021-06-16 PROCEDURE — G0145 SCR C/V CYTO,THINLAYER,RESCR: HCPCS | Performed by: FAMILY MEDICINE

## 2021-06-16 PROCEDURE — 87624 HPV HI-RISK TYP POOLED RSLT: CPT | Performed by: FAMILY MEDICINE

## 2021-06-16 RX ORDER — USTEKINUMAB 90 MG/ML
90 INJECTION, SOLUTION SUBCUTANEOUS SEE ADMIN INSTRUCTIONS
COMMUNITY
Start: 2021-05-27

## 2021-06-16 ASSESSMENT — MIFFLIN-ST. JEOR: SCORE: 1209.76

## 2021-06-16 ASSESSMENT — ENCOUNTER SYMPTOMS
BREAST MASS: 0
EYE PAIN: 0
WEAKNESS: 0
FREQUENCY: 0
HEARTBURN: 0
SORE THROAT: 0
HEMATURIA: 0
CONSTIPATION: 0
FEVER: 0
JOINT SWELLING: 1
DYSURIA: 0
COUGH: 0
MYALGIAS: 1
ABDOMINAL PAIN: 0
ARTHRALGIAS: 1
NAUSEA: 0
DIZZINESS: 1
DIARRHEA: 0
CHILLS: 0
HEMATOCHEZIA: 0
SHORTNESS OF BREATH: 0
PARESTHESIAS: 0
PALPITATIONS: 0
HEADACHES: 1

## 2021-06-16 ASSESSMENT — PAIN SCALES - GENERAL: PAINLEVEL: MODERATE PAIN (5)

## 2021-06-18 LAB
COPATH REPORT: NORMAL
PAP: NORMAL

## 2021-06-22 LAB
FINAL DIAGNOSIS: NORMAL
HPV HR 12 DNA CVX QL NAA+PROBE: NEGATIVE
HPV16 DNA SPEC QL NAA+PROBE: NEGATIVE
HPV18 DNA SPEC QL NAA+PROBE: NEGATIVE
SPECIMEN DESCRIPTION: NORMAL
SPECIMEN SOURCE CVX/VAG CYTO: NORMAL

## 2021-08-09 ENCOUNTER — TRANSFERRED RECORDS (OUTPATIENT)
Dept: HEALTH INFORMATION MANAGEMENT | Facility: CLINIC | Age: 61
End: 2021-08-09

## 2021-08-09 LAB
ALT SERPL-CCNC: 31 U/L (ref 0–78)
AST SERPL-CCNC: 22 U/L (ref 0–37)

## 2021-08-13 ENCOUNTER — MYC MEDICAL ADVICE (OUTPATIENT)
Dept: FAMILY MEDICINE | Facility: CLINIC | Age: 61
End: 2021-08-13

## 2021-08-16 ENCOUNTER — ANCILLARY PROCEDURE (OUTPATIENT)
Dept: MAMMOGRAPHY | Facility: CLINIC | Age: 61
End: 2021-08-16
Attending: FAMILY MEDICINE
Payer: COMMERCIAL

## 2021-08-16 DIAGNOSIS — Z12.31 VISIT FOR SCREENING MAMMOGRAM: ICD-10-CM

## 2021-08-16 PROCEDURE — 77063 BREAST TOMOSYNTHESIS BI: CPT | Mod: GC | Performed by: RADIOLOGY

## 2021-08-16 PROCEDURE — 77067 SCR MAMMO BI INCL CAD: CPT | Mod: GC | Performed by: RADIOLOGY

## 2021-09-01 ENCOUNTER — ANCILLARY PROCEDURE (OUTPATIENT)
Dept: ULTRASOUND IMAGING | Facility: CLINIC | Age: 61
End: 2021-09-01
Attending: FAMILY MEDICINE
Payer: COMMERCIAL

## 2021-09-01 ENCOUNTER — ANCILLARY PROCEDURE (OUTPATIENT)
Dept: MAMMOGRAPHY | Facility: CLINIC | Age: 61
End: 2021-09-01
Attending: FAMILY MEDICINE
Payer: COMMERCIAL

## 2021-09-01 DIAGNOSIS — R92.8 ABNORMAL MAMMOGRAM: ICD-10-CM

## 2021-09-01 PROCEDURE — 77065 DX MAMMO INCL CAD UNI: CPT | Mod: RT | Performed by: RADIOLOGY

## 2021-09-01 PROCEDURE — 77061 BREAST TOMOSYNTHESIS UNI: CPT | Mod: RT | Performed by: RADIOLOGY

## 2021-09-01 PROCEDURE — 76642 ULTRASOUND BREAST LIMITED: CPT | Mod: RT | Performed by: RADIOLOGY

## 2021-09-15 ENCOUNTER — TRANSFERRED RECORDS (OUTPATIENT)
Dept: HEALTH INFORMATION MANAGEMENT | Facility: CLINIC | Age: 61
End: 2021-09-15

## 2021-09-17 ENCOUNTER — IMMUNIZATION (OUTPATIENT)
Dept: FAMILY MEDICINE | Facility: CLINIC | Age: 61
End: 2021-09-17
Payer: COMMERCIAL

## 2021-09-17 DIAGNOSIS — Z23 NEED FOR PROPHYLACTIC VACCINATION AND INOCULATION AGAINST INFLUENZA: Primary | ICD-10-CM

## 2021-09-17 PROCEDURE — 90686 IIV4 VACC NO PRSV 0.5 ML IM: CPT

## 2021-09-17 PROCEDURE — 90471 IMMUNIZATION ADMIN: CPT

## 2021-09-17 PROCEDURE — 99207 PR NO CHARGE NURSE ONLY: CPT

## 2021-09-28 ENCOUNTER — TRANSFERRED RECORDS (OUTPATIENT)
Dept: HEALTH INFORMATION MANAGEMENT | Facility: CLINIC | Age: 61
End: 2021-09-28

## 2021-09-28 LAB
ALT SERPL-CCNC: 39 LU/L (ref 5–35)
AST SERPL-CCNC: 42 U/L (ref 5–34)
CREATININE (EXTERNAL): 0.85 MG/DL (ref 0.5–1.3)

## 2021-10-19 ENCOUNTER — E-VISIT (OUTPATIENT)
Dept: FAMILY MEDICINE | Facility: CLINIC | Age: 61
End: 2021-10-19
Payer: COMMERCIAL

## 2021-10-19 ENCOUNTER — MYC MEDICAL ADVICE (OUTPATIENT)
Dept: FAMILY MEDICINE | Facility: CLINIC | Age: 61
End: 2021-10-19

## 2021-10-19 DIAGNOSIS — R30.0 DIFFICULT OR PAINFUL URINATION: ICD-10-CM

## 2021-10-19 DIAGNOSIS — N39.0 ACUTE UTI (URINARY TRACT INFECTION): ICD-10-CM

## 2021-10-19 PROCEDURE — 99421 OL DIG E/M SVC 5-10 MIN: CPT | Performed by: FAMILY MEDICINE

## 2021-10-19 RX ORDER — NITROFURANTOIN 25; 75 MG/1; MG/1
100 CAPSULE ORAL 2 TIMES DAILY
Qty: 10 CAPSULE | Refills: 0 | Status: SHIPPED | OUTPATIENT
Start: 2021-10-19 | End: 2021-10-24

## 2021-10-19 NOTE — PATIENT INSTRUCTIONS
Dear Ryan Cruz,     After reviewing your responses, I would like you to come in for a urine test to make sure we treat you correctly. This urine test is to evaluate you for a possible urinary tract infection, and should be scheduled for today or tomorrow. Schedule a Lab Only appointment here.     Lab appointments are not available at most locations on the weekends. If no Lab Only appointment is available, you should be seen in any of our convenient Walk-in or Urgent Care Centers, which can be found on our website here.    It is likely you have a urinary tract infection, which is a common infection of the bladder with bacteria.  This is not a sexually transmitted infection, though urinating immediately after intercourse can help prevent infections.  Drinking lots of fluids is also helpful to clear your current infection and prevent the next one.      I have sent a prescription for antibiotics to your pharmacy to treat this infection (don't start the antibiotic until after you leave the urine sample).     It is important that you take all of your prescribed medication even if your symptoms are improving after a few doses.  Taking all of your medicine helps prevent the symptoms from returning.     If your symptoms worsen, you develop pain in your back or stomach, develop fevers, or are not improving in 5 days, please contact your primary care provider for an appointment or visit any of our convenient Walk-in or Urgent Care Centers to be seen, which can be found on our website here.       Thanks again for choosing us as your health care partner,     Jaelyn Garcia MD

## 2022-01-18 ENCOUNTER — TRANSFERRED RECORDS (OUTPATIENT)
Dept: HEALTH INFORMATION MANAGEMENT | Facility: CLINIC | Age: 62
End: 2022-01-18
Payer: COMMERCIAL

## 2022-04-20 ENCOUNTER — TELEPHONE (OUTPATIENT)
Dept: FAMILY MEDICINE | Facility: CLINIC | Age: 62
End: 2022-04-20
Payer: COMMERCIAL

## 2022-04-20 NOTE — TELEPHONE ENCOUNTER
Patient calling to report she is in [place] currently and just tested positive for covid yesterday.     She wanted to notify PCP of this.   RN will send Pt Covid tips sheet through My Chart.     Pt does not have any SOB, no chest pain, no signs of hypoxia.     Pt's main concerns are her headache and nausea. Pt reports she was supposed to get an injection this week for her Crohns but has to push that back.     Pt states she is managing well but would like to know if PCP has any advisement on specific OTC meds she could take. Pt reports she cannot take Tylenol with her Crohns,    Will route to provider to review.    Kalie Yousif RN, BSN  [facility]

## 2022-04-20 NOTE — TELEPHONE ENCOUNTER
"She is elligible for covid treatments (monoclonal jhony or paxlovid possibly). Could schedule visit to address this if desired (I think this can be set up on [facility] for \"covid therapeutics\"??)  She can use her typical otc analgesics that she would use for other headaches. Rest/fluids      "

## 2022-04-21 NOTE — TELEPHONE ENCOUNTER
Called patient and relayed on message from provider. Patient voiced her understanding and will follow up with specialist.    Veena Meza RN [facility]

## 2022-04-21 NOTE — TELEPHONE ENCOUNTER
She should check with her gi doctor on the stelera but usually would suggest holding with the infection.

## 2022-04-21 NOTE — TELEPHONE ENCOUNTER
Patient is in [place] so we can not offer visits out of state. Advised that if she would want to see if she can get antivirals or antibodies she would need to be seen in [place].  Patient states that is feeling slightly less congested today, her breathing and O2 sats are good, no SOB or wheezing noted. Her fever is not over 100.   Patient is wondering if she should take her Stelara that is due this Sunday?    Veena Meza RN [facility]

## 2022-05-09 ENCOUNTER — TRANSFERRED RECORDS (OUTPATIENT)
Dept: HEALTH INFORMATION MANAGEMENT | Facility: CLINIC | Age: 62
End: 2022-05-09
Payer: COMMERCIAL

## 2022-05-09 LAB
ALT SERPL-CCNC: 25 LU/L (ref 5–35)
AST SERPL-CCNC: 29 U/L (ref 5–34)
CREATININE (EXTERNAL): 0.59 MG/DL (ref 0.5–1.3)

## 2022-05-10 ENCOUNTER — ANCILLARY PROCEDURE (OUTPATIENT)
Dept: MAMMOGRAPHY | Facility: CLINIC | Age: 62
End: 2022-05-10
Attending: FAMILY MEDICINE
Payer: COMMERCIAL

## 2022-05-10 DIAGNOSIS — R92.8 BI-RADS CATEGORY 3 MAMMOGRAM RESULT: ICD-10-CM

## 2022-05-10 DIAGNOSIS — G43.009 MIGRAINE WITHOUT AURA AND WITHOUT STATUS MIGRAINOSUS, NOT INTRACTABLE: ICD-10-CM

## 2022-05-10 DIAGNOSIS — B00.1 RECURRENT COLD SORES: ICD-10-CM

## 2022-05-10 PROCEDURE — 77065 DX MAMMO INCL CAD UNI: CPT | Mod: RT

## 2022-05-10 PROCEDURE — 77061 BREAST TOMOSYNTHESIS UNI: CPT | Mod: RT

## 2022-05-12 RX ORDER — VALACYCLOVIR HYDROCHLORIDE 1 G/1
2000 TABLET, FILM COATED ORAL 2 TIMES DAILY
Qty: 16 TABLET | Refills: 5 | Status: SHIPPED | OUTPATIENT
Start: 2022-05-12 | End: 2023-09-14

## 2022-05-12 RX ORDER — RIZATRIPTAN BENZOATE 10 MG/1
TABLET, ORALLY DISINTEGRATING ORAL
Qty: 6 TABLET | Refills: 3 | Status: SHIPPED | OUTPATIENT
Start: 2022-05-12 | End: 2022-12-13

## 2022-05-12 NOTE — TELEPHONE ENCOUNTER
Routing refill request to provider for review/approval because:  Requested Prescriptions   Pending Prescriptions Disp Refills    rizatriptan (MAXALT-MLT) 10 MG ODT [Pharmacy Med Name: RIZATRIPTAN 10 MG ODT] 6 tablet 3     Sig: TAKE 1 TABLET WITH ONSET OF MIGRAINE, MAY REPEAT ONCE AFTER 2 HRS. DO NOT EXCEED 3 TABS IN 24 HRS.        Serotonin Agonists Failed - 5/10/2022  5:21 AM        Failed - Serotonin Agonist request needs review.     Please review patient's record. If patient has had 8 or more treatments in the past month, please forward to provider.

## 2022-06-17 ENCOUNTER — TRANSFERRED RECORDS (OUTPATIENT)
Dept: HEALTH INFORMATION MANAGEMENT | Facility: CLINIC | Age: 62
End: 2022-06-17

## 2022-07-12 ENCOUNTER — TRANSFERRED RECORDS (OUTPATIENT)
Dept: HEALTH INFORMATION MANAGEMENT | Facility: CLINIC | Age: 62
End: 2022-07-12

## 2022-07-12 LAB
ALT SERPL-CCNC: 26 IU/L (ref 0–32)
AST SERPL-CCNC: 31 IU/L (ref 0–40)

## 2022-07-31 ENCOUNTER — HEALTH MAINTENANCE LETTER (OUTPATIENT)
Age: 62
End: 2022-07-31

## 2022-08-09 ENCOUNTER — MYC MEDICAL ADVICE (OUTPATIENT)
Dept: FAMILY MEDICINE | Facility: CLINIC | Age: 62
End: 2022-08-09

## 2022-09-20 ENCOUNTER — TRANSFERRED RECORDS (OUTPATIENT)
Dept: HEALTH INFORMATION MANAGEMENT | Facility: CLINIC | Age: 62
End: 2022-09-20

## 2022-09-20 LAB
ALT SERPL-CCNC: 36 IU/L (ref 5–35)
AST SERPL-CCNC: 35 U/L (ref 5–34)
CREATININE (EXTERNAL): 0.61 MG/DL (ref 0.5–1.3)

## 2022-09-23 ENCOUNTER — IMMUNIZATION (OUTPATIENT)
Dept: FAMILY MEDICINE | Facility: CLINIC | Age: 62
End: 2022-09-23
Payer: COMMERCIAL

## 2022-09-23 DIAGNOSIS — Z23 HIGH PRIORITY FOR 2019-NCOV VACCINE: Primary | ICD-10-CM

## 2022-09-23 PROCEDURE — 91313 COVID-19,PF,MODERNA BIVALENT: CPT

## 2022-09-23 PROCEDURE — 99207 PR NO CHARGE NURSE ONLY: CPT

## 2022-09-23 PROCEDURE — 0134A COVID-19,PF,MODERNA BIVALENT: CPT

## 2022-10-16 ENCOUNTER — HEALTH MAINTENANCE LETTER (OUTPATIENT)
Age: 62
End: 2022-10-16

## 2023-01-20 ENCOUNTER — TRANSFERRED RECORDS (OUTPATIENT)
Dept: HEALTH INFORMATION MANAGEMENT | Facility: CLINIC | Age: 63
End: 2023-01-20
Payer: COMMERCIAL

## 2023-02-14 DIAGNOSIS — G43.009 MIGRAINE WITHOUT AURA AND WITHOUT STATUS MIGRAINOSUS, NOT INTRACTABLE: ICD-10-CM

## 2023-02-14 RX ORDER — RIZATRIPTAN BENZOATE 10 MG/1
TABLET, ORALLY DISINTEGRATING ORAL
Qty: 6 TABLET | Refills: 1 | Status: SHIPPED | OUTPATIENT
Start: 2023-02-14 | End: 2023-12-05

## 2023-05-21 ASSESSMENT — ENCOUNTER SYMPTOMS
BREAST MASS: 0
DIZZINESS: 0
FREQUENCY: 0
FEVER: 0
MYALGIAS: 1
WEAKNESS: 0
HEMATURIA: 0
JOINT SWELLING: 1
HEMATOCHEZIA: 0
PALPITATIONS: 0
EYE PAIN: 1
DYSURIA: 0
NERVOUS/ANXIOUS: 0
HEARTBURN: 1
NAUSEA: 1
CHILLS: 0
HEADACHES: 0
PARESTHESIAS: 0
DIARRHEA: 1
ABDOMINAL PAIN: 0
COUGH: 0
ARTHRALGIAS: 1
SHORTNESS OF BREATH: 0
SORE THROAT: 0

## 2023-05-22 ENCOUNTER — OFFICE VISIT (OUTPATIENT)
Dept: FAMILY MEDICINE | Facility: CLINIC | Age: 63
End: 2023-05-22
Payer: COMMERCIAL

## 2023-05-22 VITALS
OXYGEN SATURATION: 96 % | HEART RATE: 86 BPM | RESPIRATION RATE: 18 BRPM | DIASTOLIC BLOOD PRESSURE: 71 MMHG | TEMPERATURE: 98.1 F | HEIGHT: 62 IN | SYSTOLIC BLOOD PRESSURE: 122 MMHG | WEIGHT: 169 LBS | BODY MASS INDEX: 31.1 KG/M2

## 2023-05-22 DIAGNOSIS — M19.90 INFLAMMATORY ARTHRITIS: ICD-10-CM

## 2023-05-22 DIAGNOSIS — R79.89 ABNORMAL TSH: ICD-10-CM

## 2023-05-22 DIAGNOSIS — Z00.00 ROUTINE GENERAL MEDICAL EXAMINATION AT A HEALTH CARE FACILITY: Primary | ICD-10-CM

## 2023-05-22 DIAGNOSIS — K51.50 LEFT SIDED ULCERATIVE (CHRONIC) COLITIS (H): ICD-10-CM

## 2023-05-22 DIAGNOSIS — Z13.1 SCREENING FOR DIABETES MELLITUS: ICD-10-CM

## 2023-05-22 DIAGNOSIS — L30.9 ECZEMA, UNSPECIFIED TYPE: ICD-10-CM

## 2023-05-22 DIAGNOSIS — Z13.220 SCREENING CHOLESTEROL LEVEL: ICD-10-CM

## 2023-05-22 DIAGNOSIS — Z12.83 SKIN CANCER SCREENING: ICD-10-CM

## 2023-05-22 DIAGNOSIS — R22.42 MASS OF LEFT FOOT: ICD-10-CM

## 2023-05-22 DIAGNOSIS — Z23 HIGH PRIORITY FOR 2019-NCOV VACCINE: ICD-10-CM

## 2023-05-22 PROCEDURE — 0134A COVID-19 BIVALENT 18+ (MODERNA): CPT | Performed by: FAMILY MEDICINE

## 2023-05-22 PROCEDURE — 90715 TDAP VACCINE 7 YRS/> IM: CPT | Performed by: FAMILY MEDICINE

## 2023-05-22 PROCEDURE — 99213 OFFICE O/P EST LOW 20 MIN: CPT | Mod: 25 | Performed by: FAMILY MEDICINE

## 2023-05-22 PROCEDURE — 99396 PREV VISIT EST AGE 40-64: CPT | Mod: 25 | Performed by: FAMILY MEDICINE

## 2023-05-22 PROCEDURE — 91313 COVID-19 BIVALENT 18+ (MODERNA): CPT | Performed by: FAMILY MEDICINE

## 2023-05-22 PROCEDURE — 90471 IMMUNIZATION ADMIN: CPT | Performed by: FAMILY MEDICINE

## 2023-05-22 ASSESSMENT — ENCOUNTER SYMPTOMS
HEADACHES: 0
DIARRHEA: 1
JOINT SWELLING: 1
DIZZINESS: 0
SORE THROAT: 0
ABDOMINAL PAIN: 0
PARESTHESIAS: 0
PALPITATIONS: 0
NERVOUS/ANXIOUS: 0
BREAST MASS: 0
SHORTNESS OF BREATH: 0
CHILLS: 0
HEMATURIA: 0
ARTHRALGIAS: 1
COUGH: 0
MYALGIAS: 1
WEAKNESS: 0
DYSURIA: 0
FEVER: 0
EYE PAIN: 1
HEMATOCHEZIA: 0
HEARTBURN: 1
FREQUENCY: 0
NAUSEA: 1

## 2023-05-22 ASSESSMENT — PAIN SCALES - GENERAL: PAINLEVEL: MILD PAIN (2)

## 2023-05-22 NOTE — PROGRESS NOTES
SUBJECTIVE:   CC: Ryan is an 62 year old who presents for preventive health visit.       5/22/2023     1:23 PM   Additional Questions   Roomed by Lissett HANCOCK   Accompanied by Self         5/22/2023     1:23 PM   Patient Reported Additional Medications   Patient reports taking the following new medications See list   Patient has been advised of split billing requirements and indicates understanding: Yes  Healthy Habits:     Getting at least 3 servings of Calcium per day:  Yes    Bi-annual eye exam:  Yes    Dental care twice a year:  Yes    Sleep apnea or symptoms of sleep apnea:  None    Diet:  Gluten-free/reduced    Frequency of exercise:  2-3 days/week    Duration of exercise:  30-45 minutes    Taking medications regularly:  Yes    Medication side effects:  Not applicable    PHQ-2 Total Score: 0    Additional concerns today:  Yes          Today's PHQ-2 Score:       5/21/2023     7:53 AM   PHQ-2 ( 1999 [employer])   Q1: Little interest or pleasure in doing things 0   Q2: Feeling down, depressed or hopeless 0   PHQ-2 Score 0   Q1: Little interest or pleasure in doing things Not at all   Q2: Feeling down, depressed or hopeless Not at all   PHQ-2 Score 0       Have you ever done Advance Care Planning? (For example, a Health Directive, POLST, or a discussion with a medical provider or your loved ones about your wishes): Yes, patient states has an Advance Care Planning document and will bring a copy to the clinic.       Social History     Tobacco Use     Smoking status: Never     Passive exposure: Never     Smokeless tobacco: Never   Vaping Use     Vaping status: Never Used     Passive vaping exposure: Yes   Substance Use Topics     Alcohol use: No             5/21/2023     7:52 AM   Alcohol Use   Prescreen: >3 drinks/day or >7 drinks/week? No     Reviewed orders with patient.  Reviewed health maintenance and updated orders accordingly - Yes      Breast Cancer Screening:    FHS-7:       6/16/2021     8:02 AM 8/16/2021     10:20 AM 5/10/2022     9:37 AM 5/21/2023     7:56 AM   Breast CA Risk Assessment (FHS-7)   Did any of your first-degree relatives have breast or ovarian cancer? No Yes No No   Did any of your relatives have bilateral breast cancer? Unknown No No No   Did any man in your family have breast cancer? No No No No   Did any woman in your family have breast and ovarian cancer? Yes No No Unknown   Did any woman in your family have breast cancer before age 50 y? Yes No No Unknown   Do you have 2 or more relatives with breast and/or ovarian cancer? Yes No No Yes   Do you have 2 or more relatives with breast and/or bowel cancer? Unknown No No Yes       Mammogram Screening: Recommended annual mammography  Pertinent mammograms are reviewed under the imaging tab.    History of abnormal Pap smear: NO - age 30-65 PAP every 5 years with negative HPV co-testing recommended      Latest Ref Rng & Units 6/16/2021     2:51 PM 6/16/2021     2:47 PM 9/9/2016    12:31 PM   PAP / HPV   PAP (Historical)  NIL    NIL     HPV 16 DNA NEG^Negative  Negative   Negative     HPV 18 DNA NEG^Negative  Negative   Negative     Other HR HPV NEG^Negative  Negative   Negative       Reviewed and updated as needed this visit by clinical staff   Tobacco  Allergies  Meds              Reviewed and updated as needed this visit by Provider                     Review of Systems   Constitutional: Negative for chills and fever.   HENT: Positive for congestion and ear pain. Negative for hearing loss and sore throat.    Eyes: Positive for pain and visual disturbance.   Respiratory: Negative for cough and shortness of breath.    Cardiovascular: Negative for chest pain, palpitations and peripheral edema.   Gastrointestinal: Positive for diarrhea, heartburn and nausea. Negative for abdominal pain and hematochezia.   Breasts:  Negative for tenderness, breast mass and discharge.   Genitourinary: Negative for dysuria, frequency, genital sores, hematuria, pelvic pain,  "urgency, vaginal bleeding and vaginal discharge.   Musculoskeletal: Positive for arthralgias, joint swelling and myalgias.   Skin: Negative for rash.   Neurological: Negative for dizziness, weakness, headaches and paresthesias.   Psychiatric/Behavioral: Negative for mood changes. The patient is not nervous/anxious.      Allergies flared right now.   Cupping recently.     Soft lump on the bottom of her foot that she noted this winter.   Notices it but no pain. Not seeming to grow.     GI: bowels controlled with stellara. Some sx's week 7-8    Some wt gain when foot was hurting previously. But now back to exercise.     Intermittent knee pain. Finds if gets a massage and chiropractic care the knee feels fine.   Knee hurts over the last year. No locking, giveway    Some medial left ankle swelling.     Rheum: have been under good control until wedding where she danced a lot. Some foot mtp joint pain since then. stellara has also been helping that. Also her eczema      methotrexate, plaquenil, stellara.   On multiple supplements.     Liver function test's intermittent elevated.     Using very low dose thc to control hot flashes. Gets in [place]. Helps with sleep also.     Gets one density with rheum    OBJECTIVE:   /71 (BP Location: Right arm, Patient Position: Sitting, Cuff Size: Adult Regular)   Pulse 86   Temp 98.1  F (36.7  C) (Oral)   Resp 18   Ht 1.575 m (5' 2\")   Wt 76.7 kg (169 lb)   LMP 04/17/2014 (Exact Date)   SpO2 96%   BMI 30.91 kg/m    Physical Exam  GENERAL APPEARANCE: healthy, alert and no distress  EYES: Eyes grossly normal to inspection, PERRL and conjunctivae and sclerae normal  HENT: ear canals and TM's normal, nose and mouth without ulcers or lesions, oropharynx clear and oral mucous membranes moist  NECK: no adenopathy, no asymmetry, masses, or scars and thyroid normal to palpation  RESP: lungs clear to auscultation - no rales, rhonchi or wheezes  BREAST: normal without masses, tenderness or " nipple discharge and no palpable axillary masses or adenopathy  CV: regular rate and rhythm, normal S1 S2, no S3 or S4, no murmur, click or rub, no peripheral edema and peripheral pulses strong  ABDOMEN: soft, nontender, no hepatosplenomegaly, no masses and bowel sounds normal  MS: Plantar aspect of left midfoot is a firm, smooth prominence.  Mass is slightly tender to palpation. and gait is age appropriate without ataxia  SKIN: no suspicious lesions.  Erythematous, scaling linear rash in the interdigital space of the first and second fingers bilaterally.  NEURO: Normal strength and tone, sensory exam grossly normal, mentation intact and speech normal  PSYCH: mentation appears normal and affect normal/bright        ASSESSMENT/PLAN:   (Z00.00) Routine general medical examination at a health care facility  (primary encounter diagnosis)    (R22.42) Mass of left foot  Comment: Unclear etiology  Plan: XR Foot Left G/E 3 Views, Orthopedic          Referral        We did not have x-ray available in clinic today so this was arranged at a separate time.  We will have her follow-up with orthopedics/podiatry for further evaluation.    (L30.9) Eczema, unspecified type  Comment: Significant hand eczema is present.  She reports this is best as it has been in the last several years  Plan: Adult Dermatology Referral        Strongly recommend follow-up with dermatology for further cares.    (Z12.83) Skin cancer screening  Comment: On immunosuppressive's  Plan: Adult Dermatology Referral        Recommend annual skin exam    (R79.89) Abnormal TSH  Comment: History of  Plan: TSH with free T4 reflex        We will screen thyroid function today    (Z23) High priority for 2019-nCoV vaccine  Comment:   Plan: COVID-19 BIVALENT 18+ (MODERNA)            (Z13.220) Screening cholesterol level  Comment:   Plan: Lipid panel reflex to direct LDL Fasting            (Z13.1) Screening for diabetes mellitus  Comment:   Plan: Glucose         "  UC/crohns, inflammatory arthritis-overall is stable and doing well with her Stelara, methotrexate and hydroxychloroquine.  Her liver tests have been intermittently elevated per outside labs.  Possibly could be from medication but she also takes extensive supplements.  I did review with her supplements are common causes of elevated liver function tests.  She feels strongly that she is getting pure and safe supplements.  Discussed if liver test start rising further will need to start diving deeper into her supplements and possibly eliminating things.  She has LFT monitoring planned with her rheumatologist later this week          COUNSELING:  Reviewed preventive health counseling, as reflected in patient instructions       Regular exercise       Healthy diet/nutrition       Vision screening       Osteoporosis prevention/bone health       Colorectal Cancer Screening      BMI:   Estimated body mass index is 30.91 kg/m  as calculated from the following:    Height as of this encounter: 1.575 m (5' 2\").    Weight as of this encounter: 76.7 kg (169 lb).   Weight management plan: Discussed healthy diet and exercise guidelines      She reports that she has never smoked. She has never been exposed to tobacco smoke. She has never used smokeless tobacco.          Jaelyn Garcia MD  [facility]  "

## 2023-05-22 NOTE — NURSING NOTE
Prior to immunization administration, verified patients identity using patient s name and date of birth. Please see Immunization Activity for additional information.     Screening Questionnaire for Adult Immunization    Are you sick today?   No   Do you have allergies to medications, food, a vaccine component or latex?   No   Have you ever had a serious reaction after receiving a vaccination?   No   Do you have a long-term health problem with heart, lung, kidney, or metabolic disease (e.g., diabetes), asthma, a blood disorder, no spleen, complement component deficiency, a cochlear implant, or a spinal fluid leak?  Are you on long-term aspirin therapy?   No   Do you have cancer, leukemia, HIV/AIDS, or any other immune system problem?   No   Do you have a parent, brother, or sister with an immune system problem?   No   In the past 3 months, have you taken medications that affect  your immune system, such as prednisone, other steroids, or anticancer drugs; drugs for the treatment of rheumatoid arthritis, Crohn s disease, or psoriasis; or have you had radiation treatments?   No   Have you had a seizure, or a brain or other nervous system problem?   No   During the past year, have you received a transfusion of blood or blood    products, or been given immune (gamma) globulin or antiviral drug?   No   For women: Are you pregnant or is there a chance you could become       pregnant during the next month?   No   Have you received any vaccinations in the past 4 weeks?   No     Immunization questionnaire answers were all negative.      Screening performed by Pillo Tran MA on 5/22/2023 at 2:34 PM.

## 2023-05-22 NOTE — PATIENT INSTRUCTIONS
Schedule skin check with dermatology      Preventive Health Recommendations  Female Ages 50 - 64    Yearly exam: See your health care provider every year in order to  Review health changes.   Discuss preventive care.    Review your medicines if your doctor has prescribed any.    Get a Pap test every three years (unless you have an abnormal result and your provider advises testing more often).  If you get Pap tests with HPV test, you only need to test every 5 years, unless you have an abnormal result.   You do not need a Pap test if your uterus was removed (hysterectomy) and you have not had cancer.  You should be tested each year for STDs (sexually transmitted diseases) if you're at risk.   Have a mammogram every 1 to 2 years.  Have a colonoscopy at age 50, or have a yearly FIT test (stool test). These exams screen for colon cancer.    Have a cholesterol test every 5 years, or more often if advised.  Have a diabetes test (fasting glucose) every three years. If you are at risk for diabetes, you should have this test more often.   If you are at risk for osteoporosis (brittle bone disease), think about having a bone density scan (DEXA).    Shots: Get a flu shot each year. Get a tetanus shot every 10 years.    Nutrition:   Eat at least 5 servings of fruits and vegetables each day.  Eat whole-grain bread, whole-wheat pasta and brown rice instead of white grains and rice.  Get adequate Calcium and Vitamin D.     Lifestyle  Exercise at least 150 minutes a week (30 minutes a day, 5 days a week). This will help you control your weight and prevent disease.  Limit alcohol to one drink per day.  No smoking.   Wear sunscreen to prevent skin cancer.   See your dentist every six months for an exam and cleaning.  See your eye doctor every 1 to 2 years.

## 2023-05-23 ENCOUNTER — TRANSFERRED RECORDS (OUTPATIENT)
Dept: HEALTH INFORMATION MANAGEMENT | Facility: CLINIC | Age: 63
End: 2023-05-23
Payer: COMMERCIAL

## 2023-05-23 LAB
ALT SERPL-CCNC: 30 IU/L (ref 5–35)
AST SERPL-CCNC: 34 U/L (ref 5–34)
CREATININE (EXTERNAL): 0.64 MG/DL (ref 0.5–1.3)

## 2023-05-23 RX ORDER — MULTIVIT WITH MIN/MFOLATE/K2 340-15/3 G
POWDER (GRAM) ORAL
COMMUNITY

## 2023-05-23 RX ORDER — ACETYLCYSTEINE 600 MG
CAPSULE ORAL
COMMUNITY

## 2023-05-23 RX ORDER — ACETYLCYSTEINE 600 MG
CAPSULE ORAL DAILY
COMMUNITY
End: 2024-05-13

## 2023-05-23 RX ORDER — SAME BUTANEDISULFONATE/BETAINE 400-600 MG
POWDER IN PACKET (EA) ORAL DAILY
COMMUNITY

## 2023-05-23 RX ORDER — CYST/ALA/Q10/PHOS.SER/DHA/BROC 100-20-50
POWDER (GRAM) ORAL
COMMUNITY

## 2023-05-24 ENCOUNTER — LAB (OUTPATIENT)
Dept: LAB | Facility: CLINIC | Age: 63
End: 2023-05-24
Payer: COMMERCIAL

## 2023-05-24 ENCOUNTER — ANCILLARY PROCEDURE (OUTPATIENT)
Dept: GENERAL RADIOLOGY | Facility: CLINIC | Age: 63
End: 2023-05-24
Attending: FAMILY MEDICINE
Payer: COMMERCIAL

## 2023-05-24 DIAGNOSIS — R22.42 MASS OF LEFT FOOT: ICD-10-CM

## 2023-05-24 DIAGNOSIS — Z13.1 SCREENING FOR DIABETES MELLITUS: ICD-10-CM

## 2023-05-24 DIAGNOSIS — Z13.220 SCREENING CHOLESTEROL LEVEL: ICD-10-CM

## 2023-05-24 DIAGNOSIS — R79.89 ABNORMAL TSH: ICD-10-CM

## 2023-05-24 LAB
CHOLEST SERPL-MCNC: 199 MG/DL
FASTING STATUS PATIENT QL REPORTED: YES
GLUCOSE SERPL-MCNC: 101 MG/DL (ref 70–99)
HDLC SERPL-MCNC: 78 MG/DL
LDLC SERPL CALC-MCNC: 109 MG/DL
NONHDLC SERPL-MCNC: 121 MG/DL
TRIGL SERPL-MCNC: 58 MG/DL
TSH SERPL DL<=0.005 MIU/L-ACNC: 2.82 UIU/ML (ref 0.3–4.2)

## 2023-05-24 PROCEDURE — 36415 COLL VENOUS BLD VENIPUNCTURE: CPT

## 2023-05-24 PROCEDURE — 82947 ASSAY GLUCOSE BLOOD QUANT: CPT

## 2023-05-24 PROCEDURE — 80061 LIPID PANEL: CPT

## 2023-05-24 PROCEDURE — 73630 X-RAY EXAM OF FOOT: CPT | Mod: TC | Performed by: RADIOLOGY

## 2023-05-24 PROCEDURE — 84443 ASSAY THYROID STIM HORMONE: CPT

## 2023-05-24 NOTE — LETTER
June 2, 2023      Ryan Cruz  [address] [facility] [address]        Dear ,    We are writing to inform you of your test results.    Ryan,     It was a pleasure to see you in the office recently.      -Cholesterol panel has taken a little jump up since last check.  The LDL (bad cholesterol) is a bit above ideal range. Recommendations to reduce cholesterol and cardiovascular risks:  Diet:  -Try to eat more vegetables, fruits, legumes, nuts/seeds, whole grains, and fish.  - try to eat less red meat, processed meats, processed foods, sweetened beverages.   - try to replace saturated fat in your diet with mono- and poly-unsaturated fats   Exercise:  Aim for regular exercise with a goal of 150 min of moderate to high intensity aerobic exercise per week       Unfortunately, The fasting blood glucose is elevated in the pre-diabetic range (100-125). This puts you at risk for developing diabetes in the future.  Lifestyle measures will help to lower your blood glucose levels and lower the risks of diabetes. These measures include regular exercise, weight loss/maintaining a healthy body weight, and moderating/decreasing carbohydrates (sugar, bread, pasta, rice, baked goods, potatoes, etc) in your diet. We will continue to monitor your blood glucose annually, but please be seen sooner  if you develop any new signs or symptoms of diabetes (increase thirst or urination, fatigue, blurry vision, unexplained weight loss).       Thyroid test continues to look good.     Please MyChart or call if you have any concerns or questions.     Resulted Orders   Glucose   Result Value Ref Range    Glucose 101 (H) 70 - 99 mg/dL    Patient Fasting > 8hrs? Yes        If you have any questions or concerns, please call the clinic at the number listed above.       Sincerely,      Jaelyn Garcia MD

## 2023-05-25 NOTE — RESULT ENCOUNTER NOTE
Ryan,  It was a pleasure to see you in the office recently.   Thanks for getting the x-ray completed.  There was no abnormal finding on the x-ray to explain what you are feeling on the bottom of your foot.  Please continue with the plan to follow-up with podiatry to get this further evaluated.  Please MyChart or call if you have any concerns or questions.   Sincerely,  Jaelyn Garcia MD

## 2023-05-26 ENCOUNTER — TRANSFERRED RECORDS (OUTPATIENT)
Dept: HEALTH INFORMATION MANAGEMENT | Facility: CLINIC | Age: 63
End: 2023-05-26
Payer: COMMERCIAL

## 2023-05-26 NOTE — RESULT ENCOUNTER NOTE
Ryan,    It was a pleasure to see you in the office recently.     -Cholesterol panel has taken a little jump up since last check.  The LDL (bad cholesterol) is a bit above ideal range. Recommendations to reduce cholesterol and cardiovascular risks:  Diet:  -Try to eat more vegetables, fruits, legumes, nuts/seeds, whole grains, and fish.  - try to eat less red meat, processed meats, processed foods, sweetened beverages.   - try to replace saturated fat in your diet with mono- and poly-unsaturated fats   Exercise:  Aim for regular exercise with a goal of 150 min of moderate to high intensity aerobic exercise per week      Unfortunately, The fasting blood glucose is elevated in the pre-diabetic range (100-125). This puts you at risk for developing diabetes in the future.  Lifestyle measures will help to lower your blood glucose levels and lower the risks of diabetes. These measures include regular exercise, weight loss/maintaining a healthy body weight, and moderating/decreasing carbohydrates (sugar, bread, pasta, rice, baked goods, potatoes, etc) in your diet. We will continue to monitor your blood glucose annually, but please be seen sooner  if you develop any new signs or symptoms of diabetes (increase thirst or urination, fatigue, blurry vision, unexplained weight loss).      Thyroid test continues to look good.    Please MyChart or call if you have any concerns or questions.   Sincerely,  Jaelyn Garcia MD

## 2023-05-27 ENCOUNTER — MYC MEDICAL ADVICE (OUTPATIENT)
Dept: FAMILY MEDICINE | Facility: CLINIC | Age: 63
End: 2023-05-27
Payer: COMMERCIAL

## 2023-05-27 DIAGNOSIS — E28.39 ESTROGEN DEFICIENCY: Primary | ICD-10-CM

## 2023-06-13 ENCOUNTER — TRANSFERRED RECORDS (OUTPATIENT)
Dept: HEALTH INFORMATION MANAGEMENT | Facility: CLINIC | Age: 63
End: 2023-06-13
Payer: COMMERCIAL

## 2023-06-23 ENCOUNTER — TRANSFERRED RECORDS (OUTPATIENT)
Dept: HEALTH INFORMATION MANAGEMENT | Facility: CLINIC | Age: 63
End: 2023-06-23
Payer: COMMERCIAL

## 2023-07-03 ENCOUNTER — TRANSFERRED RECORDS (OUTPATIENT)
Dept: HEALTH INFORMATION MANAGEMENT | Facility: CLINIC | Age: 63
End: 2023-07-03
Payer: COMMERCIAL

## 2023-07-03 LAB
ALT SERPL-CCNC: 26 IU/L (ref 0–32)
AST SERPL-CCNC: 32 IU/L (ref 0–40)

## 2023-07-10 ENCOUNTER — TRANSFERRED RECORDS (OUTPATIENT)
Dept: HEALTH INFORMATION MANAGEMENT | Facility: CLINIC | Age: 63
End: 2023-07-10
Payer: COMMERCIAL

## 2023-08-03 ENCOUNTER — ANCILLARY PROCEDURE (OUTPATIENT)
Dept: BONE DENSITY | Facility: CLINIC | Age: 63
End: 2023-08-03
Attending: FAMILY MEDICINE
Payer: COMMERCIAL

## 2023-08-03 ENCOUNTER — ANCILLARY PROCEDURE (OUTPATIENT)
Dept: MAMMOGRAPHY | Facility: CLINIC | Age: 63
End: 2023-08-03
Attending: FAMILY MEDICINE
Payer: COMMERCIAL

## 2023-08-03 DIAGNOSIS — Z12.31 SCREENING MAMMOGRAM FOR HIGH-RISK PATIENT: ICD-10-CM

## 2023-08-03 DIAGNOSIS — E28.39 ESTROGEN DEFICIENCY: ICD-10-CM

## 2023-08-03 PROCEDURE — 77080 DXA BONE DENSITY AXIAL: CPT | Performed by: RADIOLOGY

## 2023-08-03 PROCEDURE — 77067 SCR MAMMO BI INCL CAD: CPT | Performed by: STUDENT IN AN ORGANIZED HEALTH CARE EDUCATION/TRAINING PROGRAM

## 2023-08-03 PROCEDURE — 77063 BREAST TOMOSYNTHESIS BI: CPT | Performed by: STUDENT IN AN ORGANIZED HEALTH CARE EDUCATION/TRAINING PROGRAM

## 2023-08-03 NOTE — LETTER
August 11, 2023      Ryan Cruz  [address] [facility] [address] [address]        Dear ,    We are writing to inform you of your test results.    Hi Ryan,     Osteopenia is diagnosed when there is mild to moderate thinning of the bone (T score -1 to -2.4). Osteoporosis is diagnosed when there is moderate to severe thinning (T score of < -2.5). Low bone density may put you at risk for a fracture.     Your bone density scan shows mild osteopenia of the lower spine and left hip.  Right hip is still in the normal range.  The density in the spine has not significantly changed since last check.  However, your overall hip density has dropped about 10% since 2017.        To help prevent further loss of bone, the following is recommended:     Take in adequate amounts of Calcium and Vitamin D. These are the building blocks for bones. Most women will need 1,200 mg of Calcium (through diet and supplements if needed) and 1,000 international units of Vitamin D daily (typically through supplements).     Exercise daily to keep your bones strong. Thirty minutes of moderate walking or other aerobic activity is recommended.     The bone density should continue to be monitored every 2 to 3 years.  Certainly, if the density continues to drop, further intervention would be warranted.     If you have any questions or concerns, please mychart or call.       Resulted Orders   DX Hip/Pelvis/Spine    Narrative    HISTORY: Estrogen deficiency    COMPARISON:   5/31/2013    Age: 63  years.  Height: 62 inches  Weight: 168 pounds  Sex: Female  Ethnicity: White    Image quality: Adequate    Lumbar spine T-score in region of L1-L4 excluding L2= -1.5   L1-L4 excluding L2 percent change: NA%     HIPS:  Mean total hip T-score: -0.5  Mean total hip percent change: -10.5%     Left femoral neck T-score = -1.3  Right femoral neck T-score= -0.9     COMPARISON TO PRIOR:  Percent change in the hips is significant accounting to the  precision  errors for this facility.     FRAX:  10 year probability of major osteoporotic fracture: 10.3%  10 year probability of hip fracture: 1.0%  The 10 year probability of fracture may be lower than reported if the  patient has received treatment. FRAX data should be disregarded in  patient's taking bisphosphonates.    World Health Organization definition of osteoporosis and osteopenia  for  women:   Normal: T-score at or above -1.0  Low Bone Mass (Osteopenia): T-score between -1.0 and -2.5.   Osteoporosis: T-score at or below -2.5   T-scores are reported for postmenopausal women and men over 50 years  of age.      Impression    IMPRESSION:  Osteopenia    LUDIN SIDHU MD         SYSTEM ID:  [id number]       If you have any questions or concerns, please call the clinic at the number listed above.       Sincerely,      Jaelyn Garcia MD

## 2023-08-04 PROBLEM — M85.89 OSTEOPENIA OF MULTIPLE SITES: Status: ACTIVE | Noted: 2023-08-04

## 2023-08-04 NOTE — RESULT ENCOUNTER NOTE
Tirso Davis,    Osteopenia is diagnosed when there is mild to moderate thinning of the bone (T score -1 to -2.4). Osteoporosis is diagnosed when there is moderate to severe thinning (T score of < -2.5). Low bone density may put you at risk for a fracture.    Your bone density scan shows mild osteopenia of the lower spine and left hip.  Right hip is still in the normal range.  The density in the spine has not significantly changed since last check.  However, your overall hip density has dropped about 10% since 2017.      To help prevent further loss of bone, the following is recommended:    Take in adequate amounts of Calcium and Vitamin D. These are the building blocks for bones. Most women will need 1,200 mg of Calcium (through diet and supplements if needed) and 1,000 international units of Vitamin D daily (typically through supplements).    Exercise daily to keep your bones strong. Thirty minutes of moderate walking or other aerobic activity is recommended.    The bone density should continue to be monitored every 2 to 3 years.  Certainly, if the density continues to drop, further intervention would be warranted.    If you have any questions or concerns, please mychart or call.     Kind regards,  Jaelyn Garcia MD

## 2023-08-23 ENCOUNTER — DOCUMENTATION ONLY (OUTPATIENT)
Dept: FAMILY MEDICINE | Facility: CLINIC | Age: 63
End: 2023-08-23
Payer: COMMERCIAL

## 2023-08-23 DIAGNOSIS — M19.90 INFLAMMATORY ARTHRITIS: ICD-10-CM

## 2023-08-23 DIAGNOSIS — K51.50 LEFT SIDED ULCERATIVE (CHRONIC) COLITIS (H): Primary | ICD-10-CM

## 2023-08-23 NOTE — PROGRESS NOTES
Ryan JI Anthony has an upcoming lab appointment and is eligible to have due Health Maintenance labs drawn per Health Maintenance Protocol Orders (HMPO) process.    Before it can be drawn, a diagnosis is needed for the following lab: CMP    Please review and enter diagnosis as appropriate.     Gloria Tamayo MLT

## 2023-09-05 ENCOUNTER — LAB (OUTPATIENT)
Dept: LAB | Facility: CLINIC | Age: 63
End: 2023-09-05
Payer: COMMERCIAL

## 2023-09-05 ENCOUNTER — ALLIED HEALTH/NURSE VISIT (OUTPATIENT)
Dept: FAMILY MEDICINE | Facility: CLINIC | Age: 63
End: 2023-09-05
Payer: COMMERCIAL

## 2023-09-05 DIAGNOSIS — R74.8 ELEVATED ALKALINE PHOSPHATASE LEVEL: ICD-10-CM

## 2023-09-05 DIAGNOSIS — M19.90 INFLAMMATORY ARTHRITIS: ICD-10-CM

## 2023-09-05 DIAGNOSIS — K51.50 LEFT SIDED ULCERATIVE (CHRONIC) COLITIS (H): ICD-10-CM

## 2023-09-05 DIAGNOSIS — Z23 ENCOUNTER FOR IMMUNIZATION: Primary | ICD-10-CM

## 2023-09-05 DIAGNOSIS — R74.8 ELEVATED ALKALINE PHOSPHATASE LEVEL: Primary | ICD-10-CM

## 2023-09-05 LAB
ALBUMIN SERPL BCG-MCNC: 4.3 G/DL (ref 3.5–5.2)
ALP SERPL-CCNC: 114 U/L (ref 35–104)
ALT SERPL W P-5'-P-CCNC: 26 U/L (ref 0–50)
ANION GAP SERPL CALCULATED.3IONS-SCNC: 9 MMOL/L (ref 7–15)
AST SERPL W P-5'-P-CCNC: 35 U/L (ref 0–45)
BILIRUB SERPL-MCNC: <0.2 MG/DL
BUN SERPL-MCNC: 9.1 MG/DL (ref 8–23)
CALCIUM SERPL-MCNC: 9.4 MG/DL (ref 8.8–10.2)
CHLORIDE SERPL-SCNC: 101 MMOL/L (ref 98–107)
CREAT SERPL-MCNC: 0.63 MG/DL (ref 0.51–0.95)
DEPRECATED HCO3 PLAS-SCNC: 27 MMOL/L (ref 22–29)
GFR SERPL CREATININE-BSD FRML MDRD: >90 ML/MIN/1.73M2
GGT SERPL-CCNC: 41 U/L (ref 5–36)
GLUCOSE SERPL-MCNC: 112 MG/DL (ref 70–99)
POTASSIUM SERPL-SCNC: 4.1 MMOL/L (ref 3.4–5.3)
PROT SERPL-MCNC: 7.2 G/DL (ref 6.4–8.3)
SODIUM SERPL-SCNC: 137 MMOL/L (ref 136–145)

## 2023-09-05 PROCEDURE — 99207 PR NO CHARGE NURSE ONLY: CPT

## 2023-09-05 PROCEDURE — 82977 ASSAY OF GGT: CPT

## 2023-09-05 PROCEDURE — 80053 COMPREHEN METABOLIC PANEL: CPT

## 2023-09-05 PROCEDURE — 90682 RIV4 VACC RECOMBINANT DNA IM: CPT

## 2023-09-05 PROCEDURE — 36415 COLL VENOUS BLD VENIPUNCTURE: CPT

## 2023-09-05 PROCEDURE — 90471 IMMUNIZATION ADMIN: CPT

## 2023-09-05 NOTE — PROGRESS NOTES
Prior to immunization administration, verified patients identity using patient s name and date of birth. Please see Immunization Activity for additional information.     Screening Questionnaire for Adult Immunization    Are you sick today?   No   Do you have allergies to medications, food, a vaccine component or latex?   No   Have you ever had a serious reaction after receiving a vaccination?   No   Do you have a long-term health problem with heart, lung, kidney, or metabolic disease (e.g., diabetes), asthma, a blood disorder, no spleen, complement component deficiency, a cochlear implant, or a spinal fluid leak?  Are you on long-term aspirin therapy?   No   Do you have cancer, leukemia, HIV/AIDS, or any other immune system problem?   No   Do you have a parent, brother, or sister with an immune system problem?   No   In the past 3 months, have you taken medications that affect  your immune system, such as prednisone, other steroids, or anticancer drugs; drugs for the treatment of rheumatoid arthritis, Crohn s disease, or psoriasis; or have you had radiation treatments?   No   Have you had a seizure, or a brain or other nervous system problem?   No   During the past year, have you received a transfusion of blood or blood    products, or been given immune (gamma) globulin or antiviral drug?   No   For women: Are you pregnant or is there a chance you could become       pregnant during the next month?   No   Have you received any vaccinations in the past 4 weeks?   No     Immunization questionnaire answers were all negative.    I have reviewed the following standing orders:   This patient is due and qualifies for the Influenza vaccine.    Click here for Influenza Vaccine Standing Order    I have reviewed the vaccines inclusion and exclusion criteria; No concerns regarding eligibility.     Patient instructed to remain in clinic for 15 minutes afterwards, and to report any adverse reactions.     Screening performed by  Lissett Leon MA on 9/5/2023 at 10:16 AM.

## 2023-09-05 NOTE — RESULT ENCOUNTER NOTE
Ryan,  The kidney and electrolyte panel looks good.   The glucose was 112 which is normal range if you were not fasting and pre-diabetic range if you were fasting at the time of the lab draw.  The AST and ALT liver tests look much better than some of your past readings.   The alkaline phosphatase level was minimally elevated. This test can go up if prolonged fasting, bone health issues or GI related issues. I will add on one additional blood test to your labs to see if we can sort through the cause for you. I'll keep you posted.   Please MyChart or call if you have any concerns or questions.   Sincerely,  Jaelyn Garcia MD

## 2023-09-06 NOTE — RESULT ENCOUNTER NOTE
Tirso Davis,  The GGT level is mildly elevated. This helps us confirm your elevated alkaline phosphatase level is likely related to your liver/GI system.   If you are noting any new GI concerns, please be seen to check them out. If you are doing well and in your normal health it would be okay to monitor this level. Plan to have your GI doc recheck with your next visit.   Kind regards,  Jaelyn Garcia MD

## 2023-09-14 DIAGNOSIS — B00.1 RECURRENT COLD SORES: ICD-10-CM

## 2023-09-14 RX ORDER — VALACYCLOVIR HYDROCHLORIDE 1 G/1
2000 TABLET, FILM COATED ORAL 2 TIMES DAILY
Qty: 16 TABLET | Refills: 1 | Status: SHIPPED | OUTPATIENT
Start: 2023-09-14 | End: 2023-09-15

## 2023-09-26 ENCOUNTER — TRANSFERRED RECORDS (OUTPATIENT)
Dept: HEALTH INFORMATION MANAGEMENT | Facility: CLINIC | Age: 63
End: 2023-09-26
Payer: COMMERCIAL

## 2023-09-26 LAB
ALT SERPL-CCNC: 35 IU/L (ref 5–35)
AST SERPL-CCNC: 38 U/L (ref 5–34)
CREATININE (EXTERNAL): 0.6 MG/DL (ref 0.5–1.3)
GFR ESTIMATED (EXTERNAL): 107 ML/MIN/1.73M2

## 2023-10-02 ENCOUNTER — TRANSFERRED RECORDS (OUTPATIENT)
Dept: HEALTH INFORMATION MANAGEMENT | Facility: CLINIC | Age: 63
End: 2023-10-02
Payer: COMMERCIAL

## 2023-12-05 DIAGNOSIS — G43.009 MIGRAINE WITHOUT AURA AND WITHOUT STATUS MIGRAINOSUS, NOT INTRACTABLE: ICD-10-CM

## 2023-12-05 RX ORDER — RIZATRIPTAN BENZOATE 10 MG/1
TABLET, ORALLY DISINTEGRATING ORAL
Qty: 6 TABLET | Refills: 1 | Status: SHIPPED | OUTPATIENT
Start: 2023-12-05 | End: 2024-05-13

## 2024-05-13 ENCOUNTER — OFFICE VISIT (OUTPATIENT)
Dept: FAMILY MEDICINE | Facility: CLINIC | Age: 64
End: 2024-05-13
Payer: COMMERCIAL

## 2024-05-13 ENCOUNTER — PATIENT OUTREACH (OUTPATIENT)
Dept: ONCOLOGY | Facility: CLINIC | Age: 64
End: 2024-05-13

## 2024-05-13 VITALS
HEIGHT: 62 IN | WEIGHT: 163.4 LBS | HEART RATE: 75 BPM | OXYGEN SATURATION: 98 % | BODY MASS INDEX: 30.07 KG/M2 | TEMPERATURE: 97.6 F | RESPIRATION RATE: 16 BRPM | DIASTOLIC BLOOD PRESSURE: 77 MMHG | SYSTOLIC BLOOD PRESSURE: 129 MMHG

## 2024-05-13 DIAGNOSIS — Z80.3 FAMILY HISTORY OF BREAST CANCER: ICD-10-CM

## 2024-05-13 DIAGNOSIS — G43.009 MIGRAINE WITHOUT AURA AND WITHOUT STATUS MIGRAINOSUS, NOT INTRACTABLE: ICD-10-CM

## 2024-05-13 DIAGNOSIS — M19.90 INFLAMMATORY ARTHRITIS: ICD-10-CM

## 2024-05-13 DIAGNOSIS — Z12.4 CERVICAL CANCER SCREENING: ICD-10-CM

## 2024-05-13 DIAGNOSIS — K50.90 CROHN'S DISEASE WITHOUT COMPLICATION, UNSPECIFIED GASTROINTESTINAL TRACT LOCATION (H): ICD-10-CM

## 2024-05-13 DIAGNOSIS — Z00.00 ROUTINE GENERAL MEDICAL EXAMINATION AT A HEALTH CARE FACILITY: Primary | ICD-10-CM

## 2024-05-13 DIAGNOSIS — F43.9 SITUATIONAL STRESS: ICD-10-CM

## 2024-05-13 DIAGNOSIS — Z13.220 SCREENING CHOLESTEROL LEVEL: ICD-10-CM

## 2024-05-13 DIAGNOSIS — R25.2 MUSCLE CRAMPING: ICD-10-CM

## 2024-05-13 LAB
ALBUMIN SERPL BCG-MCNC: 4.5 G/DL (ref 3.5–5.2)
ALP SERPL-CCNC: 110 U/L (ref 40–150)
ALT SERPL W P-5'-P-CCNC: 26 U/L (ref 0–50)
ANION GAP SERPL CALCULATED.3IONS-SCNC: 11 MMOL/L (ref 7–15)
AST SERPL W P-5'-P-CCNC: 32 U/L (ref 0–45)
BILIRUB SERPL-MCNC: 0.3 MG/DL
BUN SERPL-MCNC: 7.1 MG/DL (ref 8–23)
CALCIUM SERPL-MCNC: 9.6 MG/DL (ref 8.8–10.2)
CHLORIDE SERPL-SCNC: 99 MMOL/L (ref 98–107)
CHOLEST SERPL-MCNC: 197 MG/DL
CK SERPL-CCNC: 129 U/L (ref 26–192)
CREAT SERPL-MCNC: 0.64 MG/DL (ref 0.51–0.95)
DEPRECATED HCO3 PLAS-SCNC: 25 MMOL/L (ref 22–29)
EGFRCR SERPLBLD CKD-EPI 2021: >90 ML/MIN/1.73M2
FASTING STATUS PATIENT QL REPORTED: YES
FASTING STATUS PATIENT QL REPORTED: YES
GLUCOSE SERPL-MCNC: 99 MG/DL (ref 70–99)
HDLC SERPL-MCNC: 87 MG/DL
LDLC SERPL CALC-MCNC: 99 MG/DL
MAGNESIUM SERPL-MCNC: 2.2 MG/DL (ref 1.7–2.3)
NONHDLC SERPL-MCNC: 110 MG/DL
POTASSIUM SERPL-SCNC: 4.3 MMOL/L (ref 3.4–5.3)
PROT SERPL-MCNC: 7.3 G/DL (ref 6.4–8.3)
SODIUM SERPL-SCNC: 135 MMOL/L (ref 135–145)
TRIGL SERPL-MCNC: 54 MG/DL
TSH SERPL DL<=0.005 MIU/L-ACNC: 2.66 UIU/ML (ref 0.3–4.2)
VIT D+METAB SERPL-MCNC: 64 NG/ML (ref 20–50)

## 2024-05-13 PROCEDURE — 80053 COMPREHEN METABOLIC PANEL: CPT | Performed by: FAMILY MEDICINE

## 2024-05-13 PROCEDURE — 84443 ASSAY THYROID STIM HORMONE: CPT | Performed by: FAMILY MEDICINE

## 2024-05-13 PROCEDURE — G0145 SCR C/V CYTO,THINLAYER,RESCR: HCPCS | Performed by: FAMILY MEDICINE

## 2024-05-13 PROCEDURE — 90636 HEP A/HEP B VACC ADULT IM: CPT | Performed by: FAMILY MEDICINE

## 2024-05-13 PROCEDURE — 80061 LIPID PANEL: CPT | Performed by: FAMILY MEDICINE

## 2024-05-13 PROCEDURE — 36415 COLL VENOUS BLD VENIPUNCTURE: CPT | Performed by: FAMILY MEDICINE

## 2024-05-13 PROCEDURE — 90471 IMMUNIZATION ADMIN: CPT | Performed by: FAMILY MEDICINE

## 2024-05-13 PROCEDURE — 87624 HPV HI-RISK TYP POOLED RSLT: CPT | Performed by: FAMILY MEDICINE

## 2024-05-13 PROCEDURE — 99396 PREV VISIT EST AGE 40-64: CPT | Mod: 25 | Performed by: FAMILY MEDICINE

## 2024-05-13 PROCEDURE — 82306 VITAMIN D 25 HYDROXY: CPT | Performed by: FAMILY MEDICINE

## 2024-05-13 PROCEDURE — 99213 OFFICE O/P EST LOW 20 MIN: CPT | Mod: 25 | Performed by: FAMILY MEDICINE

## 2024-05-13 PROCEDURE — 83735 ASSAY OF MAGNESIUM: CPT | Performed by: FAMILY MEDICINE

## 2024-05-13 PROCEDURE — 82550 ASSAY OF CK (CPK): CPT | Performed by: FAMILY MEDICINE

## 2024-05-13 RX ORDER — RIZATRIPTAN BENZOATE 10 MG/1
TABLET, ORALLY DISINTEGRATING ORAL
Qty: 6 TABLET | Refills: 1 | Status: SHIPPED | OUTPATIENT
Start: 2024-05-13 | End: 2024-09-06

## 2024-05-13 SDOH — HEALTH STABILITY: PHYSICAL HEALTH: ON AVERAGE, HOW MANY DAYS PER WEEK DO YOU ENGAGE IN MODERATE TO STRENUOUS EXERCISE (LIKE A BRISK WALK)?: 2 DAYS

## 2024-05-13 SDOH — HEALTH STABILITY: PHYSICAL HEALTH: ON AVERAGE, HOW MANY MINUTES DO YOU ENGAGE IN EXERCISE AT THIS LEVEL?: 60 MIN

## 2024-05-13 ASSESSMENT — PAIN SCALES - GENERAL: PAINLEVEL: NO PAIN (0)

## 2024-05-13 ASSESSMENT — SOCIAL DETERMINANTS OF HEALTH (SDOH): HOW OFTEN DO YOU GET TOGETHER WITH FRIENDS OR RELATIVES?: MORE THAN THREE TIMES A WEEK

## 2024-05-13 NOTE — PROGRESS NOTES
received Cancer Risk Management Program referral, referred for:    family history breast cancer      Reviewed for appropriate plan, and sent to New Patient Scheduling for completion.

## 2024-05-13 NOTE — PATIENT INSTRUCTIONS
"After you turn 65 get covid-19 booster (6714-4712 version), Prevnar 20, RSV (get in late summer)    TwinRix (hepatitis A and B vacccine- first dose today. Next booster in 1-2 months, final booster in 6 months      Preventive Care Advice   This is general advice we often give to help people stay healthy. Your care team may have specific advice just for you. Please talk to your care team about your own preventive care needs.  Lifestyle  Exercise at least 150 minutes each week (30 minutes a day, 5 days a week).  Do muscle strengthening activities 2 days a week. These help control your weight and prevent disease.  No smoking.  Wear sunscreen to prevent skin cancer.  Have your home tested for radon every 2 to 5 years. Radon is a colorless, odorless gas that can harm your lungs. To learn more, go to www.health.state.[place] and search for \"Radon in Homes.\"  Keep guns unloaded and locked up in a safe place like a safe or gun vault, or, use a gun lock and hide the keys. Always lock away bullets separately. To learn more, visit [employer].gov and search for \"safe gun storage.\"  Nutrition  Eat 5 or more servings of fruits and vegetables each day.  Try wheat bread, brown rice and whole grain pasta (instead of white bread, rice, and pasta).  Get enough calcium and vitamin D. Check the label on foods and aim for 100% of the RDA (recommended daily allowance).  Regular exams  Have a dental exam and cleaning every 6 months.  See your health care team every year to talk about:  Any changes in your health.  Any medicines your care team has prescribed.  Preventive care, family planning, and ways to prevent chronic diseases.  Shots (vaccines)   HPV shots (up to age 26), if you've never had them before.  Hepatitis B shots (up to age 59), if you've never had them before.  COVID-19 shot: Get this shot when it's due.  Flu shot: Get a flu shot every year.  Tetanus shot: Get a tetanus shot every 10 years.  Pneumococcal, hepatitis A, and RSV shots: " Ask your care team if you need these based on your risk.  Shingles shot (for age 50 and up).  General health tests  Diabetes screening:  Starting at age 35, Get screened for diabetes at least every 3 years.  If you are younger than age 35, ask your care team if you should be screened for diabetes.  Cholesterol test: At age 39, start having a cholesterol test every 5 years, or more often if advised.  Bone density scan (DEXA): At age 50, ask your care team if you should have this scan for osteoporosis (brittle bones).  Hepatitis C: Get tested at least once in your life.  Abdominal aortic aneurysm screening: Talk to your doctor about having this screening if you:  Have ever smoked; and  Are biologically male; and  Are between the ages of 65 and 75.  STIs (sexually transmitted infections)  Before age 24: Ask your care team if you should be screened for STIs.  After age 24: Get screened for STIs if you're at risk. You are at risk for STIs (including HIV) if:  You are sexually active with more than one person.  You don't use condoms every time.  You or a partner was diagnosed with a sexually transmitted infection.  If you are at risk for HIV, ask about PrEP medicine to prevent HIV.  Get tested for HIV at least once in your life, whether you are at risk for HIV or not.  Cancer screening tests  Cervical cancer screening: If you have a cervix, begin getting regular cervical cancer screening tests at age 21. Most people who have regular screenings with normal results can stop after age 65. Talk about this with your provider.  Breast cancer scan (mammogram): If you've ever had breasts, begin having regular mammograms starting at age 40. This is a scan to check for breast cancer.  Colon cancer screening: It is important to start screening for colon cancer at age 45.  Have a colonoscopy test every 10 years (or more often if you're at risk) Or, ask your provider about stool tests like a FIT test every year or Cologuard test every  3 years.  To learn more about your testing options, visit: www.[employer]/034643.pdf.  For help making a decision, visit: rodger/je63514.  Prostate cancer screening test: If you have a prostate and are age 55 to 69, ask your provider if you would benefit from a yearly prostate cancer screening test.  Lung cancer screening: If you are a current or former smoker age 50 to 80, ask your care team if ongoing lung cancer screenings are right for you.  For informational purposes only. Not to replace the advice of your health care provider. Copyright   2023 [employer]. All rights reserved. Clinically reviewed by the [facility]. [employer] 225005 - REV 04/24.    Learning About Stress  What is stress?     Stress is your body's response to a hard situation. Your body can have a physical, emotional, or mental response. Stress is a fact of life for most people, and it affects everyone differently. What causes stress for you may not be stressful for someone else.  A lot of things can cause stress. You may feel stress when you go on a job interview, take a test, or run a race. This kind of short-term stress is normal and even useful. It can help you if you need to work hard or react quickly. For example, stress can help you finish an important job on time.  Long-term stress is caused by ongoing stressful situations or events. Examples of long-term stress include long-term health problems, ongoing problems at work, or conflicts in your family. Long-term stress can harm your health.  How does stress affect your health?  When you are stressed, your body responds as though you are in danger. It makes hormones that speed up your heart, make you breathe faster, and give you a burst of energy. This is called the fight-or-flight stress response. If the stress is over quickly, your body goes back to normal and no harm is done.  But if stress happens too often or lasts too long, it can have bad  effects. Long-term stress can make you more likely to get sick, and it can make symptoms of some diseases worse. If you tense up when you are stressed, you may develop neck, shoulder, or low back pain. Stress is linked to high blood pressure and heart disease.  Stress also harms your emotional health. It can make you hernandes, tense, or depressed. Your relationships may suffer, and you may not do well at work or school.  What can you do to manage stress?  You can try these things to help manage stress:   Do something active. Exercise or activity can help reduce stress. Walking is a great way to get started. Even everyday activities such as housecleaning or yard work can help.  Try yoga or leta chi. These techniques combine exercise and meditation. You may need some training at first to learn them.  Do something you enjoy. For example, listen to music or go to a movie. Practice your hobby or do volunteer work.  Meditate. This can help you relax, because you are not worrying about what happened before or what may happen in the future.  Do guided imagery. Imagine yourself in any setting that helps you feel calm. You can use online videos, books, or a teacher to guide you.  Do breathing exercises. For example:  From a standing position, bend forward from the waist with your knees slightly bent. Let your arms dangle close to the floor.  Breathe in slowly and deeply as you return to a standing position. Roll up slowly and lift your head last.  Hold your breath for just a few seconds in the standing position.  Breathe out slowly and bend forward from the waist.  Let your feelings out. Talk, laugh, cry, and express anger when you need to. Talking with supportive friends or family, a counselor, or a dequan leader about your feelings is a healthy way to relieve stress. Avoid discussing your feelings with people who make you feel worse.  Write. It may help to write about things that are bothering you. This helps you find out how  "much stress you feel and what is causing it. When you know this, you can find better ways to cope.  What can you do to prevent stress?  You might try some of these things to help prevent stress:  Manage your time. This helps you find time to do the things you want and need to do.  Get enough sleep. Your body recovers from the stresses of the day while you are sleeping.  Get support. Your family, friends, and community can make a difference in how you experience stress.  Limit your news feed. Avoid or limit time on social media or news that may make you feel stressed.  Do something active. Exercise or activity can help reduce stress. Walking is a great way to get started.  Where can you learn more?  Go to https://www.[employer].net/patiented  Enter N032 in the search box to learn more about \"Learning About Stress.\"  Current as of: October 24, 2023               Content Version: 14.0    4944-5957 [employer].   Care instructions adapted under license by your healthcare professional. If you have questions about a medical condition or this instruction, always ask your healthcare professional. [employer] disclaims any warranty or liability for your use of this information.      "

## 2024-05-13 NOTE — PROGRESS NOTES
Prior to immunization administration, verified patients identity using patient s name and date of birth. Please see Immunization Activity for additional information.     Screening Questionnaire for Adult Immunization    Are you sick today?   No   Do you have allergies to medications, food, a vaccine component or latex?   No   Have you ever had a serious reaction after receiving a vaccination?   No   Do you have a long-term health problem with heart, lung, kidney, or metabolic disease (e.g., diabetes), asthma, a blood disorder, no spleen, complement component deficiency, a cochlear implant, or a spinal fluid leak?  Are you on long-term aspirin therapy?   No   Do you have cancer, leukemia, HIV/AIDS, or any other immune system problem?   No   Do you have a parent, brother, or sister with an immune system problem?   No   In the past 3 months, have you taken medications that affect  your immune system, such as prednisone, other steroids, or anticancer drugs; drugs for the treatment of rheumatoid arthritis, Crohn s disease, or psoriasis; or have you had radiation treatments?   Yes   Have you had a seizure, or a brain or other nervous system problem?   No   During the past year, have you received a transfusion of blood or blood    products, or been given immune (gamma) globulin or antiviral drug?   No   For women: Are you pregnant or is there a chance you could become       pregnant during the next month?   No   Have you received any vaccinations in the past 4 weeks?   No     Immunization questionnaire was positive for at least one answer.  Notified Reviewed by Dr. Frost.      Patient instructed to remain in clinic for 15 minutes afterwards, and to report any adverse reactions.     Screening performed by Lissett Leon MA on 5/13/2024 at 12:25 PM.

## 2024-05-13 NOTE — PROGRESS NOTES
Preventive Care Visit  [facility]  Jaelyn Kelli Garcia MD, Family Medicine  May 13, 2024      Assessment & Plan     Routine general medical examination at a health care facility  -First Twinrix vaccination given today.  She plans to get COVID-19 booster and Prevnar 20 vaccination after turning 64 this summer.  RSV vaccination in the fall reviewed.    Muscle cramping  Intermittent muscle cramping, especially at night recently.  We reviewed hydration, stretching, electrolytes and magnesium.  She will try Pedialyte in the evenings.  Unclear if this is connected to her inflammatory arthritis or not but I encouraged her to discuss with her rheumatologist tomorrow.  - TSH with free T4 reflex; Future  - Comprehensive metabolic panel (BMP + Alb, Alk Phos, ALT, AST, Total. Bili, TP); Future  - CK total; Future  - Vitamin D Deficiency; Future  - Magnesium; Future  - TSH with free T4 reflex  - Comprehensive metabolic panel (BMP + Alb, Alk Phos, ALT, AST, Total. Bili, TP)  - CK total  - Vitamin D Deficiency  - Magnesium    Inflammatory arthritis  I suspect she has some acute enthesitis in LE she has had this in the past with her inflammatory arthritis.  She has a follow-up with her rheumatologist tomorrow and they will review.  - Adult Mental Health  Referral; Future    Crohn's disease without complication, unspecified gastrointestinal tract location (H)  Flared.  Has colonoscopy scheduled later this week with her GI provider.  Continues on hydroxychloroquine, methotrexate, Stelara, balsalazide, mesalamine, and Levsin as needed  - Adult Mental Health  Referral; Future    Situational stress  Does get flares of anxiety when her health issues flareup.  We discussed options for treatment including medications and therapy.  She declines meds and feels that the symptoms are not that significant but is open to working with a health psychologist.  - Adult Mental Health   "Referral; Future    Migraine without aura and without status migrainosus, not intractable  Longstanding history of migraines which have improved over time.  Rare infrequent symptoms.  Maxalt has been helpful still  - rizatriptan (MAXALT-MLT) 10 MG ODT; Take 1 tablet with onset of migraine, may repeat once after 2 hrs. Do not exceed 3 tabs in 24 hrs.    Family history of breast cancer  She has had a few cousins with breast cancer at a young age.  She is uncertain if genetic screening has been done in her family and is interested in pursuing this.  - Adult Oncology/Hematology  Referral; Future    Screening cholesterol level  - Lipid panel reflex to direct LDL Fasting; Future  - Lipid panel reflex to direct LDL Fasting    Cervical cancer screening  Have been screening Pap every 3 years because of her immunosuppression.  - Pap Screen with HPV - recommended age 30 - 65 years                  BMI  Estimated body mass index is 30.37 kg/m  as calculated from the following:    Height as of this encounter: 1.562 m (5' 1.5\").    Weight as of this encounter: 74.1 kg (163 lb 6.4 oz).       Counseling  Appropriate preventive services were discussed with this patient, including applicable screening as appropriate for fall prevention, nutrition, physical activity, Tobacco-use cessation, weight loss and cognition.  Checklist reviewing preventive services available has been given to the patient.  Reviewed patient's diet, addressing concerns and/or questions.   She is at risk for lack of exercise and has been provided with information to increase physical activity for the benefit of her well-being.   She is at risk for psychosocial distress and has been provided with information to reduce risk.       See Patient Instructions    Nelda Davis is a 63 year old, presenting for the following:  Physical        5/13/2024    10:52 AM   Additional Questions   Roomed by Johny   Accompanied by Self         5/13/2024    10:52 AM "   Patient Reported Additional Medications   Patient reports taking the following new medications None          HPI    Knee pain- seeing [facility]. Considering arthroscopic surgery    Muscle cramps back of both lower legs present intermittently, seen in ER once for this to r/o dvt. Us not completed given benign exam.     Might be getting plantar fascia injection   Wearing orthotics    Drinks lots of water. Limited salt intake.     On flare of ibd- see GI for scope later this week (colonoscopy). Stools are okay still. Suppository is controlling diarrhea     Gained weight back on on the steroids    Fasting    Allergy rxn last week in [place]- eyes irritated, rash. Benadryl and cold shower and resolved    .       5/13/2024   General Health   How would you rate your overall physical health? (!) FAIR   Feel stress (tense, anxious, or unable to sleep) Only a little   (!) STRESS CONCERN      5/13/2024   Nutrition   Three or more servings of calcium each day? (!) NO   Diet: Gluten-free/reduced   How many servings of fruit and vegetables per day? (!) 2-3   How many sweetened beverages each day? 0-1         5/13/2024   Exercise   Days per week of moderate/strenous exercise 2 days   Average minutes spent exercising at this level 60 min   (!) EXERCISE CONCERN      5/13/2024   Social Factors   Frequency of gathering with friends or relatives More than three times a week   Worry food won't last until get money to buy more No   Food not last or not have enough money for food? No   Do you have housing?  Yes   Are you worried about losing your housing? No   Lack of transportation? No   Unable to get utilities (heat,electricity)? No         5/13/2024   Fall Risk   Fallen 2 or more times in the past year? No   Trouble with walking or balance? No          5/13/2024   Dental   Dentist two times every year? Yes            Today's PHQ-2 Score:       5/13/2024     7:38 AM   PHQ-2 ( 1999 Pfizer)   Q1: Little interest or pleasure in doing things 0    Q2: Feeling down, depressed or hopeless 0   PHQ-2 Score 0   Q1: Little interest or pleasure in doing things Not at all   Q2: Feeling down, depressed or hopeless Not at all   PHQ-2 Score 0           5/13/2024   Substance Use   Alcohol more than 3/day or more than 7/wk Not Applicable   Do you use any other substances recreationally? No     Social History     Tobacco Use    Smoking status: Never     Passive exposure: Never    Smokeless tobacco: Never   Vaping Use    Vaping status: Never Used   Substance Use Topics    Alcohol use: No    Drug use: No           8/3/2023   LAST FHS-7 RESULTS   1st degree relative breast or ovarian cancer No   Any relative bilateral breast cancer No   Any male have breast cancer No   Any ONE woman have BOTH breast AND ovarian cancer No   Any woman with breast cancer before 50yrs Yes   2 or more relatives with breast AND/OR ovarian cancer Yes   2 or more relatives with breast AND/OR bowel cancer Yes             5/13/2024   STI Screening   New sexual partner(s) since last STI/HIV test? No     History of abnormal Pap smear: NO - age 30- 65 PAP every 3 years recommended        Latest Ref Rng & Units 6/16/2021     2:51 PM 6/16/2021     2:47 PM 9/9/2016    12:31 PM   PAP / HPV   PAP (Historical)  NIL   NIL    HPV 16 DNA NEG^Negative  Negative  Negative    HPV 18 DNA NEG^Negative  Negative  Negative    Other HR HPV NEG^Negative  Negative  Negative      ASCVD Risk   The 10-year ASCVD risk score (Adis MCKEON, et al., 2019) is: 4.3%    Values used to calculate the score:      Age: 63 years      Sex: Female      Is Non- : No      Diabetic: No      Tobacco smoker: No      Systolic Blood Pressure: 129 mmHg      Is BP treated: No      HDL Cholesterol: 78 mg/dL      Total Cholesterol: 199 mg/dL           Reviewed and updated as needed this visit by Provider                          Review of Systems  Constitutional, neuro, ENT, endocrine, pulmonary, cardiac,  "gastrointestinal, genitourinary, musculoskeletal, integument and psychiatric systems are negative, except as otherwise noted.     Objective    Exam  /77 (BP Location: Right arm, Patient Position: Sitting, Cuff Size: Adult Regular)   Pulse 75   Temp 97.6  F (36.4  C) (Tympanic)   Resp 16   Ht 1.562 m (5' 1.5\")   Wt 74.1 kg (163 lb 6.4 oz)   LMP 04/17/2014 (Exact Date)   SpO2 98%   BMI 30.37 kg/m     Estimated body mass index is 30.37 kg/m  as calculated from the following:    Height as of this encounter: 1.562 m (5' 1.5\").    Weight as of this encounter: 74.1 kg (163 lb 6.4 oz).    Physical Exam  GENERAL: alert and no distress  EYES: Eyes grossly normal to inspection, PERRL and conjunctivae and sclerae normal  HENT: ear canals and TM's normal, nose and mouth without ulcers or lesions  NECK: no adenopathy, no asymmetry, masses, or scars  RESP: lungs clear to auscultation - no rales, rhonchi or wheezes  CV: regular rate and rhythm, normal S1 S2, no S3 or S4, no murmur, click or rub, no peripheral edema  ABDOMEN: soft, nontender, no hepatosplenomegaly, no masses and bowel sounds normal   (female) w/bimanual: normal female external genitalia, normal urethral meatus, normal vaginal mucosa, and normal cervix.  Uterus is absent.  No adnexal masses.  MS: No tenderness to posterior knee or calf muscles.  Mild tenderness and swelling just proximal to the left medial ankle  SKIN: no suspicious lesions or rashes  NEURO: Normal strength and tone, mentation intact and speech normal  PSYCH: mentation appears normal, affect normal/bright        Signed Electronically by: Jaelyn Garcia MD    "

## 2024-05-14 ENCOUNTER — TRANSFERRED RECORDS (OUTPATIENT)
Dept: HEALTH INFORMATION MANAGEMENT | Facility: CLINIC | Age: 64
End: 2024-05-14
Payer: COMMERCIAL

## 2024-05-14 NOTE — RESULT ENCOUNTER NOTE
Ryan,  It was a pleasure to see you in the office  yesterday.   Kidney, liver and electrolyte panel looks good.   Thyroid test was normal.  Muscle enzyme test, CK, was normal range.  Magnesium level also looks good.  Your vitamin D level was very slightly elevated.  I would suggest holding your vitamin D supplement for the next month and restarting at 50% dose in the future to keep out of the elevated range.  Your cholesterol panel looks excellent.  Please continue with the plan we had discussed at the office.  Please MyChart or call if you have any concerns or questions.   Sincerely,  Jaelyn Garcia MD

## 2024-05-16 ENCOUNTER — TRANSFERRED RECORDS (OUTPATIENT)
Dept: HEALTH INFORMATION MANAGEMENT | Facility: CLINIC | Age: 64
End: 2024-05-16
Payer: COMMERCIAL

## 2024-05-16 LAB
BKR LAB AP GYN ADEQUACY: NORMAL
BKR LAB AP GYN INTERPRETATION: NORMAL
BKR LAB AP HPV REFLEX: NORMAL
BKR LAB AP PREVIOUS ABNORMAL: NORMAL
PATH REPORT.COMMENTS IMP SPEC: NORMAL
PATH REPORT.COMMENTS IMP SPEC: NORMAL
PATH REPORT.RELEVANT HX SPEC: NORMAL

## 2024-05-17 ENCOUNTER — TRANSFERRED RECORDS (OUTPATIENT)
Dept: HEALTH INFORMATION MANAGEMENT | Facility: CLINIC | Age: 64
End: 2024-05-17
Payer: COMMERCIAL

## 2024-05-22 LAB
HPV HR 12 DNA CVX QL NAA+PROBE: NEGATIVE
HPV16 DNA CVX QL NAA+PROBE: NEGATIVE
HPV18 DNA CVX QL NAA+PROBE: NEGATIVE
HUMAN PAPILLOMA VIRUS FINAL DIAGNOSIS: NORMAL

## 2024-05-23 ENCOUNTER — PATIENT OUTREACH (OUTPATIENT)
Dept: GASTROENTEROLOGY | Facility: CLINIC | Age: 64
End: 2024-05-23
Payer: COMMERCIAL

## 2024-06-20 ENCOUNTER — TRANSFERRED RECORDS (OUTPATIENT)
Dept: HEALTH INFORMATION MANAGEMENT | Facility: CLINIC | Age: 64
End: 2024-06-20
Payer: COMMERCIAL

## 2024-07-01 ENCOUNTER — TRANSFERRED RECORDS (OUTPATIENT)
Dept: HEALTH INFORMATION MANAGEMENT | Facility: CLINIC | Age: 64
End: 2024-07-01
Payer: COMMERCIAL

## 2024-07-16 ENCOUNTER — TRANSFERRED RECORDS (OUTPATIENT)
Dept: HEALTH INFORMATION MANAGEMENT | Facility: CLINIC | Age: 64
End: 2024-07-16
Payer: COMMERCIAL

## 2024-07-19 ENCOUNTER — ALLIED HEALTH/NURSE VISIT (OUTPATIENT)
Dept: FAMILY MEDICINE | Facility: CLINIC | Age: 64
End: 2024-07-19
Payer: COMMERCIAL

## 2024-07-19 DIAGNOSIS — Z23 ENCOUNTER FOR IMMUNIZATION: Primary | ICD-10-CM

## 2024-07-19 PROCEDURE — 90746 HEPB VACCINE 3 DOSE ADULT IM: CPT

## 2024-07-19 PROCEDURE — 90471 IMMUNIZATION ADMIN: CPT

## 2024-07-19 PROCEDURE — 99207 PR NO CHARGE NURSE ONLY: CPT

## 2024-07-19 NOTE — PROGRESS NOTES
Prior to immunization administration, verified patients identity using patient s name and date of birth. Please see Immunization Activity for additional information.     Screening Questionnaire for Adult Immunization    Are you sick today?   No   Do you have allergies to medications, food, a vaccine component or latex?   No   Have you ever had a serious reaction after receiving a vaccination?   No   Do you have a long-term health problem with heart, lung, kidney, or metabolic disease (e.g., diabetes), asthma, a blood disorder, no spleen, complement component deficiency, a cochlear implant, or a spinal fluid leak?  Are you on long-term aspirin therapy?   No   Do you have cancer, leukemia, HIV/AIDS, or any other immune system problem?   No   Do you have a parent, brother, or sister with an immune system problem?   No   In the past 3 months, have you taken medications that affect  your immune system, such as prednisone, other steroids, or anticancer drugs; drugs for the treatment of rheumatoid arthritis, Crohn s disease, or psoriasis; or have you had radiation treatments?   No   Have you had a seizure, or a brain or other nervous system problem?   No   During the past year, have you received a transfusion of blood or blood    products, or been given immune (gamma) globulin or antiviral drug?   No   For women: Are you pregnant or is there a chance you could become       pregnant during the next month?   No   Have you received any vaccinations in the past 4 weeks?   No     Immunization questionnaire answers were all negative.    I have reviewed the following standing orders:   This patient is due and qualifies for the Hepatitis B vaccine.    Click here for Hepatitis B Standing Order    I have reviewed the vaccines inclusion and exclusion criteria; No concerns regarding eligibility.     Patient instructed to remain in clinic for 15 minutes afterwards, and to report any adverse reactions.     Screening performed by Vy  ADELFO Reynoso on 7/19/2024 at 10:18 AM.

## 2024-08-15 ENCOUNTER — ANCILLARY PROCEDURE (OUTPATIENT)
Dept: MAMMOGRAPHY | Facility: CLINIC | Age: 64
End: 2024-08-15
Attending: FAMILY MEDICINE
Payer: COMMERCIAL

## 2024-08-15 DIAGNOSIS — Z12.31 VISIT FOR SCREENING MAMMOGRAM: ICD-10-CM

## 2024-08-15 PROCEDURE — 77063 BREAST TOMOSYNTHESIS BI: CPT | Mod: GC | Performed by: STUDENT IN AN ORGANIZED HEALTH CARE EDUCATION/TRAINING PROGRAM

## 2024-08-15 PROCEDURE — 77067 SCR MAMMO BI INCL CAD: CPT | Mod: GC | Performed by: STUDENT IN AN ORGANIZED HEALTH CARE EDUCATION/TRAINING PROGRAM

## 2024-08-23 ENCOUNTER — ALLIED HEALTH/NURSE VISIT (OUTPATIENT)
Dept: FAMILY MEDICINE | Facility: CLINIC | Age: 64
End: 2024-08-23
Payer: COMMERCIAL

## 2024-08-23 DIAGNOSIS — Z23 ENCOUNTER FOR IMMUNIZATION: Primary | ICD-10-CM

## 2024-08-23 PROCEDURE — 90677 PCV20 VACCINE IM: CPT

## 2024-08-23 PROCEDURE — 99207 PR NO CHARGE NURSE ONLY: CPT

## 2024-08-23 PROCEDURE — 90471 IMMUNIZATION ADMIN: CPT

## 2024-08-23 NOTE — PROGRESS NOTES
Prior to immunization administration, verified patients identity using patient s name and date of birth. Please see Immunization Activity for additional information.     Is the patient's temperature normal (100.5 or less)? Yes     Patient MEETS CRITERIA. PROCEED with vaccine administration.      Patient instructed to remain in clinic for 15 minutes afterwards, and to report any adverse reactions.      Link to Ancillary Visit Immunization Standing Orders SmartSet     Screening performed by Clif Butts MA on 8/23/2024 at 12:47 PM.

## 2024-09-01 NOTE — TELEPHONE ENCOUNTER
Maxalt      Last Written Prescription Date: 9/9/16  Last Fill Quantity: 6,  # refills: 3   Last Office Visit with [facility] or [facility] prescribing provider: 9/9/16    Prescription(s) approved per [facility] Refill Protocol.    Foster Garcia RN                                                   
Reason for Call:  Medication or medication refill:    Do you use a [facility]?  Name of the pharmacy and phone number for the current request:  [id number] - [place], [place] - [address] AT [facility]    Name of the medication requested: Rizatriptan 10 mg    Other request: All out needs asap sending H[employer] PRIORITY message    Can we leave a detailed message on this number? YES    Phone number patient can be reached at: Cell number on file:    Telephone Information:   Mobile [phone]       Best Time: any    Call taken on 2/17/2017 at 11:14 AM by Betty Che      
No

## 2024-09-06 DIAGNOSIS — G43.009 MIGRAINE WITHOUT AURA AND WITHOUT STATUS MIGRAINOSUS, NOT INTRACTABLE: ICD-10-CM

## 2024-09-06 RX ORDER — RIZATRIPTAN BENZOATE 10 MG/1
TABLET, ORALLY DISINTEGRATING ORAL
Qty: 6 TABLET | Refills: 1 | Status: SHIPPED | OUTPATIENT
Start: 2024-09-06

## 2024-09-10 ENCOUNTER — TRANSFERRED RECORDS (OUTPATIENT)
Dept: HEALTH INFORMATION MANAGEMENT | Facility: CLINIC | Age: 64
End: 2024-09-10
Payer: COMMERCIAL

## 2024-09-10 LAB
ALT SERPL-CCNC: 25 IU/L (ref 6–32)
AST SERPL-CCNC: 29 U/L (ref 10–35)
CREATININE (EXTERNAL): 0.7 MG/DL (ref 0.5–1.05)

## 2024-09-17 ENCOUNTER — TRANSFERRED RECORDS (OUTPATIENT)
Dept: HEALTH INFORMATION MANAGEMENT | Facility: CLINIC | Age: 64
End: 2024-09-17
Payer: COMMERCIAL

## 2024-09-18 ENCOUNTER — TRANSFERRED RECORDS (OUTPATIENT)
Dept: HEALTH INFORMATION MANAGEMENT | Facility: CLINIC | Age: 64
End: 2024-09-18
Payer: COMMERCIAL

## 2024-10-02 ENCOUNTER — TRANSFERRED RECORDS (OUTPATIENT)
Dept: HEALTH INFORMATION MANAGEMENT | Facility: CLINIC | Age: 64
End: 2024-10-02
Payer: COMMERCIAL

## 2024-11-11 ENCOUNTER — TRANSFERRED RECORDS (OUTPATIENT)
Dept: HEALTH INFORMATION MANAGEMENT | Facility: CLINIC | Age: 64
End: 2024-11-11
Payer: COMMERCIAL

## 2025-02-04 ENCOUNTER — TRANSFERRED RECORDS (OUTPATIENT)
Dept: HEALTH INFORMATION MANAGEMENT | Facility: CLINIC | Age: 65
End: 2025-02-04
Payer: COMMERCIAL

## 2025-02-20 ENCOUNTER — TRANSFERRED RECORDS (OUTPATIENT)
Dept: HEALTH INFORMATION MANAGEMENT | Facility: CLINIC | Age: 65
End: 2025-02-20
Payer: COMMERCIAL

## 2025-02-21 ENCOUNTER — TRANSFERRED RECORDS (OUTPATIENT)
Dept: HEALTH INFORMATION MANAGEMENT | Facility: CLINIC | Age: 65
End: 2025-02-21
Payer: COMMERCIAL

## 2025-03-05 ENCOUNTER — TRANSFERRED RECORDS (OUTPATIENT)
Dept: HEALTH INFORMATION MANAGEMENT | Facility: CLINIC | Age: 65
End: 2025-03-05
Payer: COMMERCIAL

## 2025-05-12 NOTE — PROGRESS NOTES
Medication: 5/5/2025 last refilled    levonorgestrel-ethinyl estradiol 0.1-20 MG-MCG per tablet          Possible duplicate: Cammy to review recent actions on this medication    Sig: Take 1 tablet by mouth daily.    Disp: 84 tablet    Refills: 0    Start: 5/11/2025    passed protocol.   Last office visit date: 4/26/2024   Next appointment scheduled?: Yes 6/17/2025   Patient comment: [facility] not able to [facility]ill the prescription as it was under the old insurance, therefore it was cancelled. I will not receive my new insurance cards until June but was told by the advocate office that I would be able to get enough tablets to get me through my appt.    This is a recent snapshot of the patient's [facility] medical record.  For current drug dose and complete information and questions, call [phone] or In Basket pool, [facility] ([id number])  CSN Number:  [id number]

## 2025-05-20 ENCOUNTER — TRANSFERRED RECORDS (OUTPATIENT)
Dept: HEALTH INFORMATION MANAGEMENT | Facility: CLINIC | Age: 65
End: 2025-05-20
Payer: COMMERCIAL

## 2025-05-22 ENCOUNTER — OFFICE VISIT (OUTPATIENT)
Dept: FAMILY MEDICINE | Facility: CLINIC | Age: 65
End: 2025-05-22
Attending: FAMILY MEDICINE
Payer: COMMERCIAL

## 2025-05-22 VITALS
HEIGHT: 61 IN | WEIGHT: 166 LBS | BODY MASS INDEX: 31.34 KG/M2 | OXYGEN SATURATION: 96 % | TEMPERATURE: 97.9 F | SYSTOLIC BLOOD PRESSURE: 116 MMHG | DIASTOLIC BLOOD PRESSURE: 67 MMHG | HEART RATE: 70 BPM

## 2025-05-22 DIAGNOSIS — Z13.220 SCREENING CHOLESTEROL LEVEL: ICD-10-CM

## 2025-05-22 DIAGNOSIS — G43.009 MIGRAINE WITHOUT AURA AND WITHOUT STATUS MIGRAINOSUS, NOT INTRACTABLE: ICD-10-CM

## 2025-05-22 DIAGNOSIS — K50.90 CROHN'S DISEASE WITHOUT COMPLICATION, UNSPECIFIED GASTROINTESTINAL TRACT LOCATION (H): ICD-10-CM

## 2025-05-22 DIAGNOSIS — R42 DIZZINESS: ICD-10-CM

## 2025-05-22 DIAGNOSIS — Z00.00 ROUTINE GENERAL MEDICAL EXAMINATION AT A HEALTH CARE FACILITY: Primary | ICD-10-CM

## 2025-05-22 DIAGNOSIS — M19.90 INFLAMMATORY ARTHRITIS: ICD-10-CM

## 2025-05-22 DIAGNOSIS — R53.83 OTHER FATIGUE: ICD-10-CM

## 2025-05-22 DIAGNOSIS — B00.1 RECURRENT COLD SORES: ICD-10-CM

## 2025-05-22 RX ORDER — UPADACITINIB 15 MG/1
15 TABLET, EXTENDED RELEASE ORAL DAILY
COMMUNITY

## 2025-05-22 RX ORDER — VALACYCLOVIR HYDROCHLORIDE 1 G/1
2000 TABLET, FILM COATED ORAL 2 TIMES DAILY
Qty: 16 TABLET | Refills: 1 | Status: SHIPPED | OUTPATIENT
Start: 2025-05-22

## 2025-05-22 RX ORDER — UPADACITINIB 45 MG/1
TABLET, EXTENDED RELEASE ORAL
COMMUNITY
Start: 2025-04-18 | End: 2025-05-22

## 2025-05-22 RX ORDER — RIZATRIPTAN BENZOATE 10 MG/1
TABLET, ORALLY DISINTEGRATING ORAL
Qty: 6 TABLET | Refills: 1 | Status: SHIPPED | OUTPATIENT
Start: 2025-05-22

## 2025-05-22 SDOH — HEALTH STABILITY: PHYSICAL HEALTH: ON AVERAGE, HOW MANY DAYS PER WEEK DO YOU ENGAGE IN MODERATE TO STRENUOUS EXERCISE (LIKE A BRISK WALK)?: 1 DAY

## 2025-05-22 ASSESSMENT — PAIN SCALES - GENERAL: PAINLEVEL_OUTOF10: NO PAIN (0)

## 2025-05-22 ASSESSMENT — SOCIAL DETERMINANTS OF HEALTH (SDOH): HOW OFTEN DO YOU GET TOGETHER WITH FRIENDS OR RELATIVES?: TWICE A WEEK

## 2025-05-22 NOTE — NURSING NOTE
Prior to immunization administration, verified patients identity using patient s name and date of birth. Please see Immunization Activity for additional information.     Screening Questionnaire for Adult Immunization    Are you sick today?   No   Do you have allergies to medications, food, a vaccine component or latex?   No   Have you ever had a serious reaction after receiving a vaccination?   No   Do you have a long-term health problem with heart, lung, kidney, or metabolic disease (e.g., diabetes), asthma, a blood disorder, no spleen, complement component deficiency, a cochlear implant, or a spinal fluid leak?  Are you on long-term aspirin therapy?   No   Do you have cancer, leukemia, HIV/AIDS, or any other immune system problem?   No   Do you have a parent, brother, or sister with an immune system problem?   No   In the past 3 months, have you taken medications that affect  your immune system, such as prednisone, other steroids, or anticancer drugs; drugs for the treatment of rheumatoid arthritis, Crohn s disease, or psoriasis; or have you had radiation treatments?   No   Have you had a seizure, or a brain or other nervous system problem?   No   During the past year, have you received a transfusion of blood or blood    products, or been given immune (gamma) globulin or antiviral drug?   No   For women: Are you pregnant or is there a chance you could become       pregnant during the next month?   No   Have you received any vaccinations in the past 4 weeks?   No     Immunization questionnaire answers were all negative.      Patient instructed to remain in clinic for 15 minutes afterwards, and to report any adverse reactions.     Screening performed by Mckenzie Brewer MA on 5/22/2025 at 11:13 AM.

## 2025-05-22 NOTE — PATIENT INSTRUCTIONS
Patient Education   Preventive Care Advice   This is general advice given by our system to help you stay healthy. However, your care team may have specific advice just for you. Please talk to your care team about your preventive care needs.  Nutrition  Eat 5 or more servings of fruits and vegetables each day.  Try wheat bread, brown rice and whole grain pasta (instead of white bread, rice, and pasta).  Get enough calcium and vitamin D. Check the label on foods and aim for 100% of the RDA (recommended daily allowance).  Lifestyle  Exercise at least 150 minutes each week  (30 minutes a day, 5 days a week).  Do muscle strengthening activities 2 days a week. These help control your weight and prevent disease.  No smoking.  Wear sunscreen to prevent skin cancer.  Have a dental exam and cleaning every 6 months.  Yearly exams  See your health care team every year to talk about:  Any changes in your health.  Any medicines your care team has prescribed.  Preventive care, family planning, and ways to prevent chronic diseases.  Shots (vaccines)   HPV shots (up to age 26), if you've never had them before.  Hepatitis B shots (up to age 59), if you've never had them before.  COVID-19 shot: Get this shot when it's due.  Flu shot: Get a flu shot every year.  Tetanus shot: Get a tetanus shot every 10 years.  Pneumococcal, hepatitis A, and RSV shots: Ask your care team if you need these based on your risk.  Shingles shot (for age 50 and up)  General health tests  Diabetes screening:  Starting at age 35, Get screened for diabetes at least every 3 years.  If you are younger than age 35, ask your care team if you should be screened for diabetes.  Cholesterol test: At age 39, start having a cholesterol test every 5 years, or more often if advised.  Bone density scan (DEXA): At age 50, ask your care team if you should have this scan for osteoporosis (brittle bones).  Hepatitis C: Get tested at least once in your life.  STIs (sexually  transmitted infections)  Before age 24: Ask your care team if you should be screened for STIs.  After age 24: Get screened for STIs if you're at risk. You are at risk for STIs (including HIV) if:  You are sexually active with more than one person.  You don't use condoms every time.  You or a partner was diagnosed with a sexually transmitted infection.  If you are at risk for HIV, ask about PrEP medicine to prevent HIV.  Get tested for HIV at least once in your life, whether you are at risk for HIV or not.  Cancer screening tests  Cervical cancer screening: If you have a cervix, begin getting regular cervical cancer screening tests starting at age 21.  Breast cancer scan (mammogram): If you've ever had breasts, begin having regular mammograms starting at age 40. This is a scan to check for breast cancer.  Colon cancer screening: It is important to start screening for colon cancer at age 45.  Have a colonoscopy test every 10 years (or more often if you're at risk) Or, ask your provider about stool tests like a FIT test every year or Cologuard test every 3 years.  To learn more about your testing options, visit:   .  For help making a decision, visit:   https://bit.ly/ha99447.  Prostate cancer screening test: If you have a prostate, ask your care team if a prostate cancer screening test (PSA) at age 55 is right for you.  Lung cancer screening: If you are a current or former smoker ages 50 to 80, ask your care team if ongoing lung cancer screenings are right for you.  For informational purposes only. Not to replace the advice of your health care provider. Copyright   2023 [facility]. All rights reserved. Clinically reviewed by the [facility]. [employer] [id number] - REV 01/24.  Preventing Falls: Care Instructions  Injuries and health problems such as trouble walking or poor eyesight can increase your risk of falling. So can some medicines. But there are things you can do to help  "prevent falls. You can exercise to get stronger. You can also arrange your home to make it safer.    Talk to your doctor about the medicines you take. Ask if any of them increase the risk of falls and whether they can be changed or stopped.   Try to exercise regularly. It can help improve your strength and balance. This can help lower your risk of falling.         Practice fall safety and prevention.   Wear low-heeled shoes that fit well and give your feet good support. Talk to your doctor if you have foot problems that make this hard.  Carry a cellphone or wear a medical alert device that you can use to call for help.  Use stepladders instead of chairs to reach high objects. Don't climb if you're at risk for falls. Ask for help, if needed.  Wear the correct eyeglasses, if you need them.        Make your home safer.   Remove rugs, cords, clutter, and furniture from walkways.  Keep your house well lit. Use night-lights in hallways and bathrooms.  Install and use sturdy handrails on stairways.  Wear nonskid footwear, even inside. Don't walk barefoot or in socks without shoes.        Be safe outside.   Use handrails, curb cuts, and ramps whenever possible.  Keep your hands free by using a shoulder bag or backpack.  Try to walk in well-lit areas. Watch out for uneven ground, changes in pavement, and debris.  Be careful in the winter. Walk on the grass or gravel when sidewalks are slippery. Use de-icer on steps and walkways. Add non-slip devices to shoes.    Put grab bars and nonskid mats in your shower or tub and near the toilet. Try to use a shower chair or bath bench when bathing.   Get into a tub or shower by putting in your weaker leg first. Get out with your strong side first. Have a phone or medical alert device in the bathroom with you.   Where can you learn more?  Go to https://www.[employer].net/patiented  Enter G117 in the search box to learn more about \"Preventing Falls: Care Instructions.\"  Current as of: " July 31, 2024  Content Version: 14.4    1454-3127 [employer].   Care instructions adapted under license by your healthcare professional. If you have questions about a medical condition or this instruction, always ask your healthcare professional. [employer] disclaims any warranty or liability for your use of this information.    Learning About Sleeping Well  What does sleeping well mean?     Sleeping well means getting enough sleep to feel good and stay healthy. How much sleep is enough varies among people.  The number of hours you sleep and how you feel when you wake up are both important. If you do not feel refreshed, you probably need more sleep. Another sign of not getting enough sleep is feeling tired during the day.  Experts recommend that adults get at least 7 or more hours of sleep per day. Children and older adults need more sleep.  Why is getting enough sleep important?  Getting enough quality sleep is a basic part of good health. When your sleep suffers, your physical health, mood, and your thoughts can suffer too. You may find yourself feeling more grumpy or stressed. Not getting enough sleep also can lead to serious problems, including injury, accidents, anxiety, and depression.  What might cause poor sleeping?  Many things can cause sleep problems, including:  Changes to your sleep schedule.  Stress. Stress can be caused by fear about a single event, such as giving a speech. Or you may have ongoing stress, such as worry about work or school.  Depression, anxiety, and other mental or emotional conditions.  Changes in your sleep habits or surroundings. This includes changes that happen where you sleep, such as noise, light, or sleeping in a different bed. It also includes changes in your sleep pattern, such as having jet lag or working a late shift.  Health problems, such as pain, breathing problems, and restless legs syndrome.  Lack of regular exercise.  Using alcohol, nicotine,  "or caffeine before bed.  How can you help yourself?  Here are some tips that may help you sleep more soundly and wake up feeling more refreshed.  Your sleeping area   Use your bedroom only for sleeping and sex. A bit of light reading may help you fall asleep. But if it doesn't, do your reading elsewhere in the house. Try not to use your TV, computer, smartphone, or tablet while you are in bed.  Be sure your bed is big enough to stretch out comfortably, especially if you have a sleep partner.  Keep your bedroom quiet, dark, and cool. Use curtains, blinds, or a sleep mask to block out light. To block out noise, use earplugs, soothing music, or a \"white noise\" machine.  Your evening and bedtime routine   Create a relaxing bedtime routine. You might want to take a warm shower or bath, or listen to soothing music.  Go to bed at the same time every night. And get up at the same time every morning, even if you feel tired.  What to avoid   Limit caffeine (coffee, tea, caffeinated sodas) during the day, and don't have any for at least 6 hours before bedtime.  Avoid drinking alcohol before bedtime. Alcohol can cause you to wake up more often during the night.  Try not to smoke or use tobacco, especially in the evening. Nicotine can keep you awake.  Limit naps during the day, especially close to bedtime.  Avoid lying in bed awake for too long. If you can't fall asleep or if you wake up in the middle of the night and can't get back to sleep within about 20 minutes, get out of bed and go to another room until you feel sleepy.  Avoid taking medicine right before bed that may keep you awake or make you feel hyper or energized. Your doctor can tell you if your medicine may do this and if you can take it earlier in the day.  If you can't sleep   Imagine yourself in a peaceful, pleasant scene. Focus on the details and feelings of being in a place that is relaxing.  Get up and do a quiet or boring activity until you feel " "sleepy.  Avoid drinking any liquids before going to bed to help prevent waking up often to use the bathroom.  Where can you learn more?  Go to https://www.[employer].net/patiented  Enter J942 in the search box to learn more about \"Learning About Sleeping Well.\"  Current as of: July 31, 2024  Content Version: 14.4    4008-4915 [employer].   Care instructions adapted under license by your healthcare professional. If you have questions about a medical condition or this instruction, always ask your healthcare professional. [employer] disclaims any warranty or liability for your use of this information.       "

## 2025-05-22 NOTE — PROGRESS NOTES
Preventive Care Visit  [facility]  Jaelyn Kelli Garcia MD, Family Medicine  May 22, 2025      Assessment & Plan     Routine general medical examination at a health care facility  Twinrix and COVID-19 vaccination given today.    Crohn's disease without complication, unspecified gastrointestinal tract location (H)  Unfortunately this has been more flared up over the last year.  She follows closely with a GI provider in [place] and also with [facility] here in [place].  She is now on Rinvoc  - COMPREHENSIVE METABOLIC PANEL; Future  - COMPREHENSIVE METABOLIC PANEL    Inflammatory arthritis  Followed with rheumatology, on Plaquenil and methotrexate.  Overall this has been fairly stable  - COMPREHENSIVE METABOLIC PANEL; Future  - TSH with free T4 reflex; Future  - COMPREHENSIVE METABOLIC PANEL  - TSH with free T4 reflex    Other fatigue  This has been more prevalent since she has started on Rinvoc  - Vitamin D Deficiency; Future  - Vitamin D Deficiency    Dizziness  Seems to have some positional vertigo.  Less likely orthostatic.  Recommended a trial of PT.  Patient also attributes the dizziness to being on the Rivoc  - TSH with free T4 reflex; Future  - Physical Therapy  Referral; Future  - TSH with free T4 reflex    Migraine without aura and without status migrainosus, not intractable  Overall this has been well-controlled.  Will continue infrequent use of rizatriptan  - rizatriptan (MAXALT-MLT) 10 MG ODT; Take 1 tablet with onset of migraine, may repeat once after 2 hrs. Do not exceed 3 tabs in 24 hrs.    Recurrent cold sores  Controlled but uses full cycle of year as needed  - valACYclovir (VALTREX) 1000 mg tablet; Take 2 tablets (2,000 mg) by mouth 2 times daily.    Screening cholesterol level  - Lipid panel reflex to direct LDL Fasting; Future  - Lipid panel reflex to direct LDL Fasting            BMI  Estimated body mass index is 31.37 kg/m  as calculated from the  "following:    Height as of this encounter: 1.549 m (5' 1\").    Weight as of this encounter: 75.3 kg (166 lb).   Weight management plan: Discussed healthy diet and exercise guidelines    Counseling  Appropriate preventive services were addressed with this patient via screening, questionnaire, or discussion as appropriate for fall prevention, nutrition, physical activity, Tobacco-use cessation, social engagement, weight loss and cognition.  Checklist reviewing preventive services available has been given to the patient.  Reviewed patient's diet, addressing concerns and/or questions.   She is at risk for lack of exercise and has been provided with information to increase physical activity for the benefit of her well-being.   Discussed possible causes of fatigue.     Follow-up    Follow-up Visit   Expected date:  May 22, 2026 (Approximate)      Follow Up Appointment Details:     Follow-up with whom?: PCP    Follow-Up for what?: Adult Preventive    How?: In Person               The longitudinal plan of care for the diagnosis(es)/condition(s) as documented were addressed during this visit. Due to the added complexity in care, I will continue to support Ryan in the subsequent management and with ongoing continuity of care.        Subjective   Ryan is a 64 year old, presenting for the following:  Physical        5/22/2025     9:49 AM   Additional Questions   Roomed by Marylou     Pt declined the gait test    HPI    Bowel ds flared this winter. Changed to Rinvoc for biologic (soha inhib). Diarrhea/abd pain/sweating when flared.   More fatigued with Rinvoc and occ dizziness from this med  Wintering in [place] for 7 mo.  retiring this next year.   Anxiety higher with selling a business    Hard to exercise due to her joints. Worked with . Can't really walk. Can bike and swim.   Wt jumping up with above. Already has low appetite.     Appt with podiatry for pinched nerve in foot.     Feeling of being off balance with " movements of her head on the Rinvoc.     Migraines controlled.     Occ oral sores and scalp sores with inflammation.   Inflammatory arthritis baseline- plaquenil and methotrexate     Had labs with rheum earlier this week.       Advance Care Planning    Document on file is a Health Care Directive or POLST.        5/22/2025   General Health   How would you rate your overall physical health? (!) FAIR   Feel stress (tense, anxious, or unable to sleep) Only a little   (!) STRESS CONCERN      5/22/2025   Nutrition   Diet: Gluten-free/reduced         5/22/2025   Exercise   Days per week of moderate/strenous exercise 1 day   (!) EXERCISE CONCERN      5/22/2025   Social Factors   Frequency of gathering with friends or relatives Twice a week   Worry food won't last until get money to buy more No   Food not last or not have enough money for food? No   Do you have housing? (Housing is defined as stable permanent housing and does not include staying outside in a car, in a tent, in an abandoned building, in an overnight shelter, or couch-surfing.) Yes   Are you worried about losing your housing? No   Lack of transportation? No   Unable to get utilities (heat,electricity)? No         5/22/2025   Fall Risk   Fallen 2 or more times in the past year? No   Trouble with walking or balance? Yes           5/22/2025   Activities of Daily Living- Home Safety   Needs help with the following daily activites None of the above   Safety concerns in the home None of the above         5/22/2025   Dental   Dentist two times every year? Yes         5/22/2025   Hearing Screening   Hearing concerns? None of the above         5/22/2025   Driving Risk Screening   Patient/family members have concerns about driving No         5/22/2025   General Alertness/Fatigue Screening   Have you been more tired than usual lately? (!) YES         5/22/2025   Urinary Incontinence Screening   Bothered by leaking urine in past 6 months No         Today's PHQ-2 Score:        5/22/2025     9:43 AM   PHQ-2 ( 1999 Pfizer)   Q1: Little interest or pleasure in doing things 0   Q2: Feeling down, depressed or hopeless 0   PHQ-2 Score 0    Q1: Little interest or pleasure in doing things Not at all   Q2: Feeling down, depressed or hopeless Not at all   PHQ-2 Score 0       Patient-reported           5/22/2025   Substance Use   Alcohol more than 3/day or more than 7/wk No   Do you have a current opioid prescription? No   How severe/bad is pain from 1 to 10? 3/10   Do you use any other substances recreationally? No     Social History     Tobacco Use    Smoking status: Never     Passive exposure: Never    Smokeless tobacco: Never   Vaping Use    Vaping status: Never Used   Substance Use Topics    Alcohol use: No    Drug use: No           8/15/2024   LAST FHS-7 RESULTS   1st degree relative breast or ovarian cancer No   Any relative bilateral breast cancer No   Any male have breast cancer No   Any ONE woman have BOTH breast AND ovarian cancer No   Any woman with breast cancer before 50yrs No   2 or more relatives with breast AND/OR ovarian cancer Yes   2 or more relatives with breast AND/OR bowel cancer Yes        Mammogram Screening - Mammogram every 1-2 years updated in Health Maintenance based on mutual decision making      History of abnormal Pap smear: No - age 21 - 64 - Patient with other risk factor requiring more frequent screening - see link Cervical Cytology Screening Guidelines        Latest Ref Rng & Units 5/13/2024    11:52 AM 6/16/2021     2:51 PM 6/16/2021     2:47 PM   PAP / HPV   PAP  Negative for Intraepithelial Lesion or Malignancy (NILM)      PAP (Historical)   NIL     HPV 16 DNA Negative Negative   Negative    HPV 18 DNA Negative Negative   Negative    Other HR HPV Negative Negative   Negative      ASCVD Risk   The 10-year ASCVD risk score (Adis MCKEON, et al., 2019) is: 3.8%    Values used to calculate the score:      Age: 64 years      Sex: Female      Is  "Non- : No      Diabetic: No      Tobacco smoker: No      Systolic Blood Pressure: 116 mmHg      Is BP treated: No      HDL Cholesterol: 87 mg/dL      Total Cholesterol: 197 mg/dL            Reviewed and updated as needed this visit by Provider   Tobacco      Surg Hx  Fam Hx              Current providers sharing in care for this patient include:  Patient Care Team:  Jaelyn Garcia MD as PCP - General  Jaelyn Garcia MD as Assigned PCP    The following health maintenance items are reviewed in Epic and correct as of today:  Health Maintenance   Topic Date Due    CMP  05/13/2025    LIPID  05/13/2025    COVID-19 Vaccine (10 - 2024-25 season) 07/17/2025    MEDICARE ANNUAL WELLNESS VISIT  05/22/2026    ANNUAL REVIEW OF HM ORDERS  05/22/2026    FALL RISK ASSESSMENT  05/22/2026    COLORECTAL CANCER SCREENING  06/01/2026    DEXA  08/03/2026    MAMMO SCREENING  08/15/2026    PAP FOLLOW-UP  05/13/2027    HPV FOLLOW-UP  05/13/2027    DIABETES SCREENING  05/13/2027    ADVANCE CARE PLANNING  05/22/2028    DTAP/TDAP/TD IMMUNIZATION (3 - Td or Tdap) 05/22/2033    HEPATITIS C SCREENING  Completed    HIV SCREENING  Completed    MIGRAINE ACTION PLAN  Completed    PHQ-2 (once per calendar year)  Completed    INFLUENZA VACCINE  Completed    Pneumococcal Vaccine: 50+ Years  Completed    ZOSTER IMMUNIZATION  Completed    RSV VACCINE  Completed    HPV IMMUNIZATION  Aged Out    MENINGITIS IMMUNIZATION  Aged Out    PAP  Discontinued         Review of Systems  Constitutional, neuro, ENT, endocrine, pulmonary, cardiac, gastrointestinal, genitourinary, musculoskeletal, integument and psychiatric systems are negative, except as otherwise noted.     Objective    Exam  /67 (BP Location: Right arm, Patient Position: Sitting, Cuff Size: Adult Regular)   Pulse 70   Temp 97.9  F (36.6  C) (Oral)   Ht 1.549 m (5' 1\")   Wt 75.3 kg (166 lb)   LMP 04/17/2014 (Exact Date)   SpO2 96%   BMI " "31.37 kg/m     Estimated body mass index is 31.37 kg/m  as calculated from the following:    Height as of this encounter: 1.549 m (5' 1\").    Weight as of this encounter: 75.3 kg (166 lb).    Physical Exam  GENERAL: alert and no distress  EYES: Eyes grossly normal to inspection, PERRL and conjunctivae and sclerae normal  HENT: ear canals and TM's normal, nose and mouth without ulcers or lesions  NECK: no adenopathy, no asymmetry, masses, or scars  RESP: lungs clear to auscultation - no rales, rhonchi or wheezes  BREAST: normal without masses, tenderness or nipple discharge and no palpable axillary masses or adenopathy  CV: regular rate and rhythm, normal S1 S2, no S3 or S4, no murmur, click or rub, no peripheral edema  ABDOMEN: soft, mild diffuse tenderness.  No mass appreciated  MS: no gross musculoskeletal defects noted, no edema  SKIN: Scaling erythema in interdigital webspace of 1st and 2nd fingers  NEURO: Normal strength and tone, mentation intact and speech normal  PSYCH: mentation appears normal, affect normal/bright         5/22/2025   Mini Cog   Clock Draw Score 2 Normal   3 Item Recall 3 objects recalled   Mini Cog Total Score 5             Signed Electronically by: Jaelyn Garcia MD    "

## 2025-05-23 ENCOUNTER — RESULTS FOLLOW-UP (OUTPATIENT)
Dept: FAMILY MEDICINE | Facility: CLINIC | Age: 65
End: 2025-05-23

## 2025-05-23 NOTE — RESULT ENCOUNTER NOTE
Ryan,  It was a pleasure to see you in the office yesterday.    The kidney, liver and electrolyte panel looks good.  The glucose level was mildly elevated if you were fasting but normal for nonfasting sample.  If I am remembering correctly you were not fully fasting at the time of blood draw.     Your vitamin D level is still mildly elevated but better than it had been.  Consider skipping your vitamin D supplement 1 or 2 days a week to keep your levels in a more normal range.    Unfortunately, your cholesterol has really jumped up since last check. The LDL (bad cholesterol) is quite a bit above the goal of less than 100.  This number is not usually impacted by fasting for the sample.  Were not quite to the level that would require medication treatment to lower cholesterol but I would encourage you over this next year to work on lifestyle measures as you are able to keep your cholesterol down.  Will plan to recheck your cholesterol in 1 year.    Recommendations to reduce cholesterol and cardiovascular risks (I know some of these things are not always doable for you):  Diet:  -Try to eat more vegetables, fruits, legumes, nuts/seeds, whole grains, and fish.  - try to eat less red meat, processed meats, processed foods, sweetened beverages.   - try to replace saturated fat in your diet with mono- and poly-unsaturated fats  Exercise:  Aim for regular exercise with a goal of 150 min of moderate to high intensity aerobic exercise per week      Please MyChart or call if you have any concerns or questions.     Sincerely,  Jaelyn Garcia MD

## 2025-06-02 ENCOUNTER — TRANSFERRED RECORDS (OUTPATIENT)
Dept: GASTROENTEROLOGY | Facility: CLINIC | Age: 65
End: 2025-06-02
Payer: COMMERCIAL

## 2025-06-04 NOTE — PROCEDURES
2025        Ryan Cruz   [address] [place] [address]      Ryan Cruz,  :  1960    It was good to see you the other day.  I'm writing to tell you about the tests that were taken.   Thank you for allowing [facility] the opportunity to take part in your healthcare.  At [facility] we strive to provide each patient with the finest gastroenterology care available.  We hope your experience was pleasant and informative.    Your cholesterol is elevated.  Please send these results to your primary care doctor to compare to your prior lipid panel and see if this is something that will require treatment.    The rest of your labs are unremarkable.  C-Reactive Protein, Quant 2025 10:52   Description Result Units Flags Range   C-Reactive Protein, Quant <1 mg/L  0-10   Comments   Performed At: [facility], [facility] [address], [place], [id number]  Greg Núñez MD, Phone: [phone]   Vitamin D, 25-Hydroxy 2025 10:52   Description Result Units Flags Range   Vitamin D, 25-Hydroxy 50.8 ng/mL  30.0-100.0   Comments   Performed At: [facility], [facility] [address], [place], [id number]  Greg Núñez MD, Phone: [phone]   Vitamin D, 25-Hydroxy:  Vitamin D deficiency has been defined by the [place] of  Medicine and an Endocrine Society practice guideline as a  level of serum 25-OH vitamin D less than 20 ng/mL (1,2).  The Endocrine Society went on to further define vitamin D  insufficiency as a level between 21 and 29 ng/mL (2).  1. IOM ([place] of Medicine). 2010. Dietary reference     intakes for calcium and D. [place]: The     National Academies Press.  2. Sri MF, Jose NC, Radhames-Rahat COTE, et al.     Evaluation, treatment, and prevention of vitamin D     deficiency: an Endocrine Society clinical practice     guideline. JCEM. 2011; 96(7):1911-30.   Lipid Panel 2025 10:52   Description Result Units Flags Range   Cholesterol, Total 270 mg/dL H 100-199    HDL Cholesterol 86 mg/dL  >39   LDL Chol Calc ([facility]) 168 mg/dL H 0-99   Triglycerides 93 mg/dL  0-149   VLDL Cholesterol Nehemiah 16 mg/dL  5-40   Comments   Performed At: [facility]  [address] [place], [place], [id number]  Greg Núñez MD, Phone: [phone]   Comp. Metabolic Panel (14) 06/02/2025 10:52   Description Result Units Flags Range   BUN 9 mg/dL  8-27   BUN/Creatinine Ratio 15   12-28   Carbon Dioxide, Total 22 mmol/L  20-29   Globulin, Total 2.4 g/dL  1.5-4.5   Bilirubin, Total <0.2 mg/dL  0.0-1.2   AST (SGOT) 33 IU/L  0-40   ALT (SGPT) 31 IU/L  0-32   Albumin 4.6 g/dL  3.9-4.9   Alkaline Phosphatase 107 IU/L     Calcium 9.5 mg/dL  8.7-10.3   Chloride 102 mmol/L     Creatinine 0.59 mg/dL  0.57-1.00   eGFR 100 mL/min/1.73  >59   Glucose 102 mg/dL H 70-99   Potassium 4.9 mmol/L  3.5-5.2   Protein, Total 7.0 g/dL  6.0-8.5   Sodium 138 mmol/L  134-144   Comments   Performed At: [facility] [place], [place], [id number]  Greg Núñez MD, Phone: [phone]   CBC With Differential/Platelet 06/02/2025 10:52   Description Result Units Flags Range   Hemoglobin 13.9 g/dL  11.1-15.9   Hematocrit 42.2 %  34.0-46.6   MCV 97 fL  79-97   MCHC 32.9 g/dL  31.5-35.7   MCH 32.0 pg  26.6-33.0   RDW 13.3 %  11.7-15.4   Platelets 356 x10E3/uL  150-450   Neutrophils 47 %  Not Estab.   Lymphs 42 %  Not Estab.   Monocytes 8 %  Not Estab.   Eos 2 %  Not Estab.   Basos 1 %  Not Estab.   Neutrophils (Absolute) 2.8 x10E3/uL  1.4-7.0   Lymphs (Absolute) 2.4 x10E3/uL  0.7-3.1   Monocytes(Absolute) 0.5 x10E3/uL  0.1-0.9   Eos (Absolute) 0.1 x10E3/uL  0.0-0.4   Baso (Absolute) 0.0 x10E3/uL  0.0-0.2   Immature Grans (Abs) 0.0 x10E3/uL  0.0-0.1   Immature Granulocytes 0 %  Not Estab.   RBC 4.35 x10E6/uL  3.77-5.28   WBC 5.8 x10E3/uL  3.4-10.8   Comments   Performed At: [facility], [facility]  [address] [facility], [place], [place], [id number]  Greg Núñez MD, Phone: [phone]         Once again, it  was good to see you.  I hope you continue to do well.  If symptoms persist or if you have questions regarding your results, please call our office.       Thank you.    Electronically signed by:  Sandip Castillo MD 06/03/2025 12:51 PM  Document generated by:  Sandip Castillo MD  06/03/2025  If your provider ordered multiple tests; the results may not become available at the same time.  If multiple test results are received within 14 days of one another, you may receive a duplicate.  cc:  Jaelyn Garcia MD  cc:     cc:  Jaelyn Garcia MD  cc:  Kody Fraire MD

## 2025-06-30 ENCOUNTER — THERAPY VISIT (OUTPATIENT)
Dept: PHYSICAL THERAPY | Facility: CLINIC | Age: 65
End: 2025-06-30
Attending: FAMILY MEDICINE
Payer: COMMERCIAL

## 2025-06-30 DIAGNOSIS — R42 DIZZINESS: Primary | ICD-10-CM

## 2025-06-30 PROCEDURE — 97161 PT EVAL LOW COMPLEX 20 MIN: CPT | Mod: GP

## 2025-06-30 NOTE — PROGRESS NOTES
PHYSICAL THERAPY EVALUATION  Type of Visit: Evaluation        Fall Risk Screen:  Have you fallen 2 or more times in the past year?: No  Have you fallen and had an injury in the past year?: Yes      Subjective   Patient is a 64 year old female with a history of migraines who presents with a referral diagnosis of dizziness. She notes she started Rinvoc medication earlier this year and she notes the dizziness started this medication. She has recently reduced dose of this medication and did note some reduction in symptoms. She does have a history of migraines which have reduced in frequency. Patient indicates some symptoms when she leans forward and standing back up, through describes this as a lightheadedness. Denies any symptoms with bed mobility.         Presenting condition or subjective complaint: dizziness  Date of onset: 05/22/25 (referral date)    Relevant medical history:     Dates & types of surgery:      Prior diagnostic imaging/testing results:       Prior therapy history for the same diagnosis, illness or injury: No      Prior Level of Function  Transfers: Independent  Ambulation: Independent  ADL: Independent  IADL: Driving, Finances, Housekeeping, Laundry, Meal preparation, Medication management    Living Environment  Social support: With a significant other or spouse   Type of home: House   Stairs to enter the home: Yes 4 Is there a railing: Yes     Ramp: No   Stairs inside the home: Yes 26 Is there a railing: Yes     Help at home: None  Equipment owned:       Employment: No    Hobbies/Interests: gardening reading walking    Patient goals for therapy:      Pain assessment: Pain denied     Objective      Cognitive Status Examination  Orientation: Oriented to person, place and time   Level of Consciousness: Alert  Follows Commands and Answers Questions: 100% of the time  Personal Safety and Judgement: Intact  Memory: Intact    OBSERVATION: Patient ambulating IND without device  INTEGUMENTARY:  Intact  POSTURE: WFL  PALPATION: NT  RANGE OF MOTION: LE ROM WFL  STRENGTH: LE Strength WFL    BED MOBILITY: Independent    TRANSFERS: Independent    WHEELCHAIR MOBILITY: NA    GAIT:   Level of [place]: Independent  Assistive Device(s): None  Gait Deviations: WFL    BALANCE: Functional, not formally assessed       VESTIBULAR EVALUATION  ADDITIONAL HISTORY:  Description of symptoms: Off balance; Light-headedness  Dizzy attacks:   Start:     Last attack:     Frequency of occurrences:     Length of attack:    Difficulty hearing:    Noise in ears? Yes ringing not always  Alleviates symptoms:    Worsens symptoms: ?  Activities that bring on symptoms: Bending over; Turning while walking       Pertinent visual history: NA  Pertinent history of current vestibular problem: Migraines  DHI: Total Score: (Patient-Rptd) 14      Oculomotor Screen    Ocular ROM Normal   Smooth Pursuit Normal   Saccades Normal   VOR Normal   VOR Cancellation Normal   Head Impulse Test Normal   Convergence Testing Normal        Infrared Goggle Exam Vestibular Suppressant in Last 24 Hours? No  Exam Completed With: Infrared goggles   Spontaneous Nystagmus Negative   Gaze Evoked Nystagmus Negative   Head Shake Horizontal Nystagmus Negative    Left Right   Dominguez-Hallpike Negative Negative   HSCC Supine Roll Test Negative Negative   No signs/symptoms of BPPV on this date.       Assessment & Plan   CLINICAL IMPRESSIONS  Medical Diagnosis: Dizziness    Treatment Diagnosis: Lightheadedness   Impression/Assessment: On evaluation, vestibular screen unremarkable and unable to reproduce prior symptoms. Low suspicion of peripheral vestibular involvement at this time. Patient encouraged to follow up with PCP for continued management if symptoms return. No PT POC indicated at this time.     Clinical Decision Making (Complexity):  Clinical Presentation: Stable/Uncomplicated  Clinical Presentation Rationale: based on medical and personal factors listed in PT  evaluation  Clinical Decision Making (Complexity): Low complexity    PLAN OF CARE  Treatment Interventions:    Frequency of Treatment: evaluation only    Recommended Referrals to Other Professionals: Return to PCP for continued management of symptoms  Education Assessment:   Learner/Method: Patient;Demonstration;Listening  Education Comments: Pt verbalizes/demos understanding of education.    Risks and benefits of evaluation/treatment have been explained.   Patient/Family/caregiver agrees with Plan of Care.     Evaluation Time:     PT Nhung, Low Complexity Minutes (53833): 25  Evaluation Only     Signing Clinician: Naida Ahn PT, DPT

## 2025-07-28 ENCOUNTER — TRANSFERRED RECORDS (OUTPATIENT)
Dept: HEALTH INFORMATION MANAGEMENT | Facility: CLINIC | Age: 65
End: 2025-07-28
Payer: COMMERCIAL

## 2025-08-15 ENCOUNTER — TRANSFERRED RECORDS (OUTPATIENT)
Dept: GASTROENTEROLOGY | Facility: CLINIC | Age: 65
End: 2025-08-15
Payer: COMMERCIAL

## 2025-08-19 ENCOUNTER — TRANSFERRED RECORDS (OUTPATIENT)
Dept: ADMINISTRATIVE | Facility: CLINIC | Age: 65
End: 2025-08-19
Payer: COMMERCIAL

## 2025-08-20 ENCOUNTER — ANCILLARY PROCEDURE (OUTPATIENT)
Dept: MAMMOGRAPHY | Facility: CLINIC | Age: 65
End: 2025-08-20
Attending: FAMILY MEDICINE
Payer: COMMERCIAL

## 2025-08-20 DIAGNOSIS — Z12.31 VISIT FOR SCREENING MAMMOGRAM: ICD-10-CM

## 2025-08-20 PROCEDURE — 77067 SCR MAMMO BI INCL CAD: CPT | Mod: GC | Performed by: RADIOLOGY

## 2025-08-20 PROCEDURE — 77063 BREAST TOMOSYNTHESIS BI: CPT | Mod: GC | Performed by: RADIOLOGY
